# Patient Record
Sex: MALE | Race: WHITE | Employment: OTHER | URBAN - METROPOLITAN AREA
[De-identification: names, ages, dates, MRNs, and addresses within clinical notes are randomized per-mention and may not be internally consistent; named-entity substitution may affect disease eponyms.]

---

## 2017-10-05 ENCOUNTER — TELEPHONE (OUTPATIENT)
Dept: UROLOGY | Age: 67
End: 2017-10-05

## 2017-10-05 ENCOUNTER — OFFICE VISIT (OUTPATIENT)
Dept: UROLOGY | Age: 67
End: 2017-10-05
Payer: COMMERCIAL

## 2017-10-05 VITALS
SYSTOLIC BLOOD PRESSURE: 97 MMHG | HEART RATE: 93 BPM | TEMPERATURE: 97.9 F | HEIGHT: 71 IN | WEIGHT: 180 LBS | BODY MASS INDEX: 25.2 KG/M2 | DIASTOLIC BLOOD PRESSURE: 70 MMHG

## 2017-10-05 DIAGNOSIS — N40.1 BENIGN NON-NODULAR PROSTATIC HYPERPLASIA WITH LOWER URINARY TRACT SYMPTOMS: ICD-10-CM

## 2017-10-05 DIAGNOSIS — R33.9 URINARY RETENTION: Primary | ICD-10-CM

## 2017-10-05 PROCEDURE — 51798 US URINE CAPACITY MEASURE: CPT | Performed by: UROLOGY

## 2017-10-05 PROCEDURE — 99204 OFFICE O/P NEW MOD 45 MIN: CPT | Performed by: UROLOGY

## 2017-10-05 RX ORDER — OXYCODONE HYDROCHLORIDE 5 MG/1
5 TABLET ORAL 3 TIMES DAILY PRN
COMMUNITY
Start: 2017-10-04 | End: 2017-11-28

## 2017-10-05 RX ORDER — VERAPAMIL HYDROCHLORIDE 240 MG/1
240 TABLET, FILM COATED, EXTENDED RELEASE ORAL DAILY
COMMUNITY
Start: 2016-12-29 | End: 2017-11-28

## 2017-10-05 RX ORDER — TAMSULOSIN HYDROCHLORIDE 0.4 MG/1
CAPSULE ORAL
Qty: 60 CAPSULE | Refills: 1 | Status: SHIPPED | OUTPATIENT
Start: 2017-10-05 | End: 2017-11-30

## 2017-10-05 RX ORDER — LOSARTAN POTASSIUM 100 MG/1
100 TABLET ORAL DAILY
COMMUNITY
Start: 2016-12-07

## 2017-10-05 RX ORDER — ALBUTEROL SULFATE 90 UG/1
1 AEROSOL, METERED RESPIRATORY (INHALATION) PRN
COMMUNITY
Start: 2016-11-14

## 2017-10-05 RX ORDER — TAMSULOSIN HYDROCHLORIDE 0.4 MG/1
0.4 CAPSULE ORAL DAILY
COMMUNITY
Start: 2017-09-07 | End: 2017-10-05 | Stop reason: SDUPTHER

## 2017-10-05 RX ORDER — LORAZEPAM 0.5 MG/1
0.5 TABLET ORAL NIGHTLY
COMMUNITY
End: 2017-12-21 | Stop reason: ALTCHOICE

## 2017-10-05 RX ORDER — METOPROLOL TARTRATE 50 MG/1
25 TABLET, FILM COATED ORAL 2 TIMES DAILY
COMMUNITY

## 2017-10-05 RX ORDER — MULTIVITAMIN WITH IRON
1 TABLET ORAL DAILY
COMMUNITY
End: 2017-12-13

## 2017-10-05 RX ORDER — GABAPENTIN 300 MG/1
300 CAPSULE ORAL 3 TIMES DAILY
COMMUNITY
Start: 2017-10-04 | End: 2017-11-28

## 2017-10-05 ASSESSMENT — ENCOUNTER SYMPTOMS
BACK PAIN: 1
EYE REDNESS: 0
NAUSEA: 0
SHORTNESS OF BREATH: 0
VOMITING: 0
COUGH: 0
ABDOMINAL PAIN: 0
EYE PAIN: 0
WHEEZING: 0
COLOR CHANGE: 0

## 2017-10-05 NOTE — PROGRESS NOTES
Lab/Culture results: none    Imaging Reviewed during this Office Visit: none    (results were independently reviewed by physician and radiology report verified)    PAST MEDICAL, FAMILY AND SOCIAL HISTORY:  Past Medical History:   Diagnosis Date    Hypertension     Lumbar spinal stenosis      Past Surgical History:   Procedure Laterality Date    LUMBAR LAMINECTOMY  09/05/2017    L2-3, L3-4    ROTATOR CUFF REPAIR Left     x2      Family History   Problem Relation Age of Onset    High Blood Pressure Mother     High Blood Pressure Father     High Blood Pressure Brother      Outpatient Prescriptions Marked as Taking for the 10/5/17 encounter (Office Visit) with Tony Dial MD   Medication Sig Dispense Refill    losartan (COZAAR) 100 MG tablet Take 100 mg by mouth daily      metoprolol tartrate (LOPRESSOR) 50 MG tablet Take 50 mg by mouth daily      verapamil (CALAN SR) 240 MG extended release tablet Take 240 mg by mouth daily      mometasone-formoterol (DULERA) 200-5 MCG/ACT inhaler Inhale 2 puffs into the lungs as needed      albuterol sulfate HFA (PROAIR HFA) 108 (90 Base) MCG/ACT inhaler Inhale 1 puff into the lungs as needed      LORazepam (ATIVAN) 0.5 MG tablet Take 0.5 mg by mouth every 6 hours as needed      tamsulosin (FLOMAX) 0.4 MG capsule Take 0.4 mg by mouth daily      oxyCODONE (ROXICODONE) 5 MG immediate release tablet Take 5 mg by mouth 3 times daily as needed .  gabapentin (NEURONTIN) 300 MG capsule Take 300 mg by mouth 3 times daily      Multiple Vitamins TABS Take 1 tablet by mouth daily         Prednisone  History   Smoking Status    Never Smoker   Smokeless Tobacco    Not on file      (If patient a smoker, smoking cessation counseling offered)   History   Alcohol Use    Yes     Comment: socially       REVIEW OF SYSTEMS:  Review of Systems   Constitutional: Negative for activity change, chills and fever. Eyes: Negative for pain, redness and visual disturbance.    Respiratory: Negative for cough, shortness of breath and wheezing. Cardiovascular: Negative for chest pain and leg swelling. Gastrointestinal: Negative for abdominal pain, nausea and vomiting. Genitourinary: Positive for difficulty urinating (urinary hesitancy and weak stream) and frequency. Negative for discharge, dysuria, flank pain, hematuria, scrotal swelling and testicular pain. Musculoskeletal: Positive for back pain. Negative for joint swelling and myalgias. Skin: Negative for color change and rash. Neurological: Negative for dizziness, tremors and numbness. Hematological: Negative for adenopathy. Does not bruise/bleed easily. Physical Exam:    This a 78 y.o. male   Vitals:    10/05/17 0850   BP: 97/70   Pulse: 93   Temp:      Body mass index is 25.1 kg/(m^2). Physical Exam  Constitutional: Patient in no acute distress. Neuro: Alert and oriented to person, place and time. Psych: Mood normal, affect normal  Skin: No rash noted  HEENT: Head: Normocephalic and atraumatic  Conjunctivae and EOM are normal. Pupils are equal, round  Nose: Normal  Right External Ear: Normal; Left External Ear: Normal  Mouth: Mucosa Moist  Neck: Supple  Lungs: Respiratory effort is normal  Cardiovascular: Warm & Pink  Abdomen: Soft, non-tender, non-distended with no CVA,  No flank tenderness,  Or hepatosplenomegaly   Lymphatics: No palpable lymphadenopathy. Bladder non-tender and not distended. Musculoskeletal: Normal gait and station      Assessment and Plan      1. Urinary retention    2. Benign non-nodular prostatic hyperplasia with lower urinary tract symptoms           Plan:    cysto for greenlight    Prescriptions Ordered:  No orders of the defined types were placed in this encounter.      Orders Placed:  Orders Placed This Encounter   Procedures    PSA, Diagnostic     Standing Status:   Future     Standing Expiration Date:   9/30/2018    GA MEASUREMENT,POST-VOID RESIDUAL VOLUME BY Susannah Chung

## 2017-10-05 NOTE — TELEPHONE ENCOUNTER
pts wife called asking about the Flomax she believes he was told to  Increase the flomax to 2 pills per day  But no meds were ordered and the pt will be out tomorrow   Please advise

## 2017-10-05 NOTE — MR AVS SNAPSHOT
After Visit Summary             Marialuisa Valle   10/5/2017 8:20 AM   Office Visit    Description:  Male : 1950   Provider:  Karime Giron MD   Department:  [facility] - [address] [place] [address] 114 and Future Appointments         Below is a list of your follow-up and future appointments. This may not be a complete list as you may have made appointments directly with providers that we are not aware of or your providers may have made some for you. Please call your providers to confirm appointments. It is important to keep your appointments. Please bring your current insurance card, photo ID, co-pay, and all medication bottles to your appointment. If self-pay, payment is expected at the time of service. Your To-Do List     Future Appointments Provider Department Dept Phone    10/17/2017 4:10 PM Karime Giron MD [facility] [phone]    Future Orders Complete By Expires    PSA, Diagnostic [[id number] Custom]  10/5/2017 2018    Follow-Up    Return for Cystoscopy. Information from Your Visit        Department     Name Address Phone Fax    [facility] [address]  [address]  [address] [address] [phone]      You Were Seen for:         Comments    Urinary retention   [[id number]]         Vital Signs     Blood Pressure Pulse Temperature Height Weight Body Mass Index    97/70 93 97.9 °F (36.6 °C) (Oral) 5' 11\" (1.803 m) 180 lb (81.6 kg) 25.1 kg/m2    Smoking Status                   Never Smoker           Additional Information about your Body Mass Index (BMI)           Your BMI as listed above is considered overweight (25.0-29.9). BMI is an estimate of body fat, calculated from your height and weight.   The higher your BMI, the greater your risk of heart disease, high blood pressure, type 2 diabetes, stroke, gallstones, arthritis, sleep apnea, and certain Hepatitis C screening is recommended for all adults regardless of risk factors born between [facility] at least once (lifetime) who have never been tested. 1950    Tetanus Combination Vaccine (1 - Tdap) 1/31/1970    Cholesterol Screening 1/31/1991    Diabetes Screening 1/31/1991    Colonoscopy 1/31/2001    Zoster Vaccine 1/31/2011    Pneumococcal Vaccines (two) for all adults aged 72 and over (1 of 2 - PCV13) 1/31/2016    Yearly Flu Vaccine (1) 9/1/2017            MyChart Signup           [email or web address] allows you to send messages to your doctor, view your test results, renew your prescriptions, schedule appointments, view visit notes, and more. How Do I Sign Up? 1. In your Internet browser, go to [email or web address]  2. Click on the Sign Up Now link in the Sign In box. You will see the New Member Sign Up page. 3. Enter your [email or web address] Access Code exactly as it appears below. You will not need to use this code after youve completed the sign-up process. If you do not sign up before the expiration date, you must request a new code. [email or web address] Access Code: [email or web address]  Expires: 12/4/2017  9:21 AM    4. Enter your Social Security Number ([id number]) and Date of Birth (mm/dd/yyyy) as indicated and click Submit. You will be taken to the next sign-up page. 5. Create a [email or web address] ID. This will be your [email or web address] login ID and cannot be changed, so think of one that is secure and easy to remember. 6. Create a [email or web address] password. You can change your password at any time. 7. Enter your Password Reset Question and Answer. This can be used at a later time if you forget your password. 8. Enter your e-mail address. You will receive e-mail notification when new information is available in [address] 9. Click Sign Up. You can now view your medical record.      Additional Information  If you have questions, please contact the physician practice where you receive care. Remember, [employer] is NOT to be used for urgent needs. For medical emergencies, dial 911. For questions regarding your [employer] account call [phone]. If you have a clinical question, please call your doctor's office.

## 2017-10-06 ENCOUNTER — HOSPITAL ENCOUNTER (OUTPATIENT)
Age: 67
Discharge: HOME | End: 2017-10-06
Payer: MEDICARE

## 2017-10-06 DIAGNOSIS — R33.9 URINARY RETENTION: ICD-10-CM

## 2017-10-06 LAB — PROSTATE SPECIFIC ANTIGEN: 0.45 UG/L

## 2017-10-06 PROCEDURE — 84153 ASSAY OF PSA TOTAL: CPT

## 2017-10-06 PROCEDURE — 36415 COLL VENOUS BLD VENIPUNCTURE: CPT

## 2017-10-11 ENCOUNTER — TELEPHONE (OUTPATIENT)
Dept: UROLOGY | Age: 67
End: 2017-10-11

## 2017-10-11 NOTE — TELEPHONE ENCOUNTER
Pt's wife called yesterday wanting to know if he should be taking the flomax twice daily or two at a time once per day. Also, had concerns that his urine output has worsened lately. Per Tano Quiroga CNP, he may take the medication however he would like, that proves to be more effective for him and continue increased fluids for proper urinary output. Cysto is scheduled for 10/17/17, keep appt. Pt was informed and verbalized understanding.

## 2017-10-17 ENCOUNTER — PROCEDURE VISIT (OUTPATIENT)
Dept: UROLOGY | Age: 67
End: 2017-10-17
Payer: MEDICARE

## 2017-10-17 VITALS
BODY MASS INDEX: 25.19 KG/M2 | TEMPERATURE: 97.4 F | DIASTOLIC BLOOD PRESSURE: 68 MMHG | SYSTOLIC BLOOD PRESSURE: 130 MMHG | HEIGHT: 71 IN | RESPIRATION RATE: 16 BRPM | WEIGHT: 179.9 LBS | HEART RATE: 59 BPM

## 2017-10-17 DIAGNOSIS — R39.14 BENIGN PROSTATIC HYPERPLASIA WITH INCOMPLETE BLADDER EMPTYING: Primary | ICD-10-CM

## 2017-10-17 DIAGNOSIS — N40.1 BENIGN PROSTATIC HYPERPLASIA WITH INCOMPLETE BLADDER EMPTYING: Primary | ICD-10-CM

## 2017-10-17 PROCEDURE — 52000 CYSTOURETHROSCOPY: CPT | Performed by: UROLOGY

## 2017-10-17 PROCEDURE — 1036F TOBACCO NON-USER: CPT | Performed by: UROLOGY

## 2017-11-01 ENCOUNTER — TELEPHONE (OUTPATIENT)
Dept: UROLOGY | Age: 67
End: 2017-11-01

## 2017-11-01 DIAGNOSIS — R30.0 DYSURIA: Primary | ICD-10-CM

## 2017-11-02 ENCOUNTER — HOSPITAL ENCOUNTER (OUTPATIENT)
Age: 67
Setting detail: SPECIMEN
Discharge: HOME | End: 2017-11-02
Payer: MEDICARE

## 2017-11-02 LAB
BILIRUBIN URINE: NEGATIVE
COLOR: YELLOW
COMMENT UA: NORMAL
GLUCOSE URINE: NEGATIVE
KETONES, URINE: NEGATIVE
LEUKOCYTE ESTERASE, URINE: NEGATIVE
NITRITE, URINE: NEGATIVE
PH UA: 7.5 (ref 5–8)
PROTEIN UA: NEGATIVE
SPECIFIC GRAVITY UA: 1.01 (ref 1–1.03)
TURBIDITY: CLEAR
URINE HGB: NEGATIVE
UROBILINOGEN, URINE: NORMAL

## 2017-11-03 DIAGNOSIS — R30.0 BURNING WITH URINATION: Primary | ICD-10-CM

## 2017-11-03 RX ORDER — CIPROFLOXACIN 500 MG/1
500 TABLET, FILM COATED ORAL 2 TIMES DAILY
Qty: 20 TABLET | Refills: 0 | Status: SHIPPED | OUTPATIENT
Start: 2017-11-03 | End: 2017-11-06 | Stop reason: ALTCHOICE

## 2017-11-03 NOTE — TELEPHONE ENCOUNTER
Pt called with the following complaints He relayed that he is  Having  a burning sensation in his genitels  in the middle of the night and  is unable to void at that time, he i is using a heat pack on it to relieve the pain , He also relayed  having trouble getting urine to start when he can void he is having an out put of 200-300 ml. He stated that last night he was up 8 times to use the restroom.

## 2017-11-04 LAB
CULTURE: NO GROWTH
CULTURE: NORMAL
Lab: NORMAL
SPECIMEN DESCRIPTION: NORMAL
STATUS: NORMAL

## 2017-11-06 ENCOUNTER — TELEPHONE (OUTPATIENT)
Dept: UROLOGY | Age: 67
End: 2017-11-06

## 2017-11-06 NOTE — TELEPHONE ENCOUNTER
Will stop Cipro culture was negative. Continue Flomax. He is seeing his Ortho Dr in am for worsening back pain. Follow up as scheduled, call with questions sooner. Verbalized understanding.

## 2017-11-16 ENCOUNTER — TELEPHONE (OUTPATIENT)
Dept: UROLOGY | Age: 67
End: 2017-11-16

## 2017-11-20 ENCOUNTER — TELEPHONE (OUTPATIENT)
Dept: UROLOGY | Age: 67
End: 2017-11-20

## 2017-11-28 ENCOUNTER — HOSPITAL ENCOUNTER (OUTPATIENT)
Dept: PAIN MANAGEMENT | Age: 67
Discharge: HOME | End: 2017-11-28
Payer: MEDICARE

## 2017-11-28 VITALS
BODY MASS INDEX: 23.1 KG/M2 | SYSTOLIC BLOOD PRESSURE: 128 MMHG | HEIGHT: 71 IN | RESPIRATION RATE: 16 BRPM | TEMPERATURE: 98.1 F | DIASTOLIC BLOOD PRESSURE: 78 MMHG | WEIGHT: 165 LBS | HEART RATE: 74 BPM

## 2017-11-28 DIAGNOSIS — M46.1 BILATERAL SACROILIITIS (HCC): Chronic | ICD-10-CM

## 2017-11-28 DIAGNOSIS — M96.1 POSTLAMINECTOMY SYNDROME, LUMBAR REGION: Chronic | ICD-10-CM

## 2017-11-28 PROCEDURE — 99204 OFFICE O/P NEW MOD 45 MIN: CPT | Performed by: ANESTHESIOLOGY

## 2017-11-28 PROCEDURE — 99203 OFFICE O/P NEW LOW 30 MIN: CPT

## 2017-11-28 RX ORDER — TIZANIDINE 2 MG/1
2 TABLET ORAL NIGHTLY
COMMUNITY
End: 2017-11-28 | Stop reason: SDUPTHER

## 2017-11-28 RX ORDER — TIZANIDINE 2 MG/1
2 TABLET ORAL NIGHTLY
Qty: 30 TABLET | Refills: 0 | Status: SHIPPED | OUTPATIENT
Start: 2017-11-28 | End: 2017-12-13

## 2017-11-28 ASSESSMENT — PAIN DESCRIPTION - PROGRESSION: CLINICAL_PROGRESSION: GRADUALLY WORSENING

## 2017-11-28 ASSESSMENT — PAIN DESCRIPTION - PAIN TYPE: TYPE: CHRONIC PAIN

## 2017-11-28 ASSESSMENT — PAIN SCALES - GENERAL: PAINLEVEL_OUTOF10: 9

## 2017-11-28 ASSESSMENT — PAIN DESCRIPTION - ONSET: ONSET: PROGRESSIVE

## 2017-11-28 ASSESSMENT — PAIN DESCRIPTION - ORIENTATION: ORIENTATION: LOWER

## 2017-11-28 ASSESSMENT — PAIN DESCRIPTION - LOCATION: LOCATION: BACK;BUTTOCKS

## 2017-11-28 ASSESSMENT — PAIN DESCRIPTION - DESCRIPTORS: DESCRIPTORS: STABBING;BURNING;CONSTANT

## 2017-11-28 ASSESSMENT — PAIN DESCRIPTION - FREQUENCY: FREQUENCY: CONTINUOUS

## 2017-11-28 NOTE — PROGRESS NOTES
Patient is here today for a Consult appointment. The patient is a [facility] patient : No     The [facility] allowed levels are: na    Type of last Diagnostics: lumbar  MR    Date of last diagnostics: 10/2017    Pain Assessment  Pain Assessment: 0-10  Pain Level: 9  Pain Type: Chronic pain  Pain Location: Back, Buttocks  Pain Orientation: Lower  Pain Radiating Towards: low back occ in legs to knee  Pain Descriptors: Stabbing, Burning, Constant  Pain Frequency: Continuous  Pain Onset: Progressive  Clinical Progression: Gradually worsening  Effect of Pain on Daily Activities: 4/5   doing ADL only   leg pain better since surgery in sept but back pain cont    MOI  Today  No    OARRS today  Yes     Result of OARRS - reviewed    Morphine Equivalent Dose as reported on OARRS:  0    Sleep Pattern,  2 hours per night   nightime awakenings and difficulty falling back asleep if awakened    ER visits related to Pain?  No  Date of ER visit   N/A    Other Hospitalization  No    Reason for Hospitalization na    Date of last Pain Intervention  August / 2017    Type of Pain Intervention back injection with dr Ebony Puri    Was there pain relief from Pain Intervention: No      Working  retired    Home Exercises rarely no regular exercise    When was your last Physical Therapy: oct 2017 aquatic therapy  Made pain worse       Are you having any of these Emotional Issues? normal    Are you seeing a Psychiatrist or Psychologist  No     Have you ever had thoughts of hurting yourself no    Outpatient Prescriptions Marked as Taking for the 11/28/17 encounter [facility] Encounter) with [facility] FLUORO RM 1   Medication Sig Dispense Refill    losartan (COZAAR) 100 MG tablet Take 100 mg by mouth daily      metoprolol tartrate (LOPRESSOR) 50 MG tablet Take 50 mg by mouth daily      mometasone-formoterol (DULERA) 200-5 MCG/ACT inhaler Inhale 2 puffs into the lungs as needed      albuterol sulfate HFA (PROAIR HFA) 108 (90 Base) MCG/ACT inhaler Inhale 1 puff into the lungs as needed      LORazepam (ATIVAN) 0.5 MG tablet Take 0.5 mg by mouth every 6 hours as needed      Multiple Vitamins TABS Take 1 tablet by mouth daily      tamsulosin (FLOMAX) 0.4 MG capsule Take 2 tablets by mouth daily 60 capsule 1       Are your Current Pain medication (s) managing the pain  No    Have been on any anti-inflammatory medications  Yes ibuprofen (Motrin)    Other Issues na    Electronically signed by Nikki Mata RN on 11/28/2017 at 1:16 PM

## 2017-11-28 NOTE — CONSULTS
[facility]                                   CONSULTATION    PATIENT NAME: Lucinda Campbell                     :        1950  MED REC NO:   [id number]                             ROOM:  ACCOUNT NO:   [de-identified]                           ADMIT DATE: 2017  PROVIDER:     Tiara Garcia    CONSULT DATE:  2017    Dr. Devika Robbins, thank you for your kind referral of the patient to our pain  clinic. Following is the narrative summary of my examination and  recommendation. CHIEF COMPLAINT:  Low back pain. HISTORY OF PRESENT ILLNESS:  The patient has been having low back pain for  the last 7-plus years without any history of trauma or any other inciting  factors. After failing conservative therapy, including physical therapy,  he was seen by another pain doctor in [facility] where he had 2 lumbar epidural  steroid injections which did not seem to help. Since the failure of the  conservative therapy, he ended up in the lumbar laminectomy by Dr. Devika Robbins on  2017. According to the wife, the surgery did not seem to help him  much. He still has the same pain in the lower back. It is mainly in the  lower lumbar area. It is constant with variable intensity with a pain  scale of 8/10 at the present time. He describes the pain as kind of  burning in nature and \"somebody is sticking a hot poker in my back. \"  Very  rarely he has some sharp pain going down the leg where the pain is very  intensified or severe, mainly to the right leg up to the knees. He has  occasional numbness in the bilateral feet. The pain can be worse after  standing for long and bending. The pain is decreased by resting. He has tried NSAID, OxyContin, Norco, etc in the past without much help. He rather not take any pain medication at all. No history of any bowel or  bladder incontinence.     PAST MEDICAL AND SURGICAL HISTORY: Medical history positive for  hypertension, on medication; and also benign prostatic hypertrophy, on  medication and is awaiting surgery in a month. There is no recent or acute  cardiopulmonary problem. Negative for MI, negative for stroke, negative  for diabetes. Surgical history positive for recent left shoulder surgery. I believe he has some partial frozen shoulder, for which he has gone  through physical therapy, which made the pain in the lower back worse. PERSONAL HISTORY:  The patient is . He is retired. He does not  smoke. He consumes alcohol occasionally. No history of any illegal  substance abuse. No history of depression. His sleep is disturbed because  of the frequent urination and also the pain itself. Appetite is average. He thinks he had lost about 15 pounds because of pain. PHYSICAL EXAMINATION:  VITAL SIGNS:  His vital signs are documented in the EPIC system. Blood  pressure is 122/78, pulse is 74, respirations 20, he is 5 feet 11 inches  tall, weighs about 165 pounds. GENERAL:  On clinical examination, the patient is a very pleasant  61-year-old  male patient, who does not appear to be in any acute  distress. He is awake, alert, oriented, communicative, is cooperative. He  is well-built, well-nourished. He has got some cervical kyphosis and also  has thoracolumbar kyphosis while walking. HEENT AND NECK:  Head is normocephalic. Pupils are equal and reactive. No  nasal or ear discharge. Trachea in the midline. No carotid bruits. No  increase in JVP. No thyroid or lymph node enlargement. UPPER EXTREMITIES:  His left shoulder range of motion is decreased by 50%  after the second surgery and is very painful. He also has some arthritic  changes in the fingers of both hands. LUNGS:  Clear. HEART:  S1, S2 well-heard. ABDOMEN:  Soft, nontender.   NEUROLOGIC:  Gait appears to be normal, but there is some kyphosis at the  cervical spine and thoracolumbar spine. He is able to stand on the toes  and heel, but unable to walk. MUSCULOSKELETAL:  Lumbosacral range of motion, flexion and extension is  decreased by at least 80% in both directions. Lumbar side-to-side  movements are within normal limits. On examination of the back, he does  have a scar from the previous surgery in the midline in the lumbar area,  seemed to be healed well. There is some diffuse tenderness over this scar,  also moderate tenderness in the left and right SI joints. LOWER EXTREMITIES:  On examination of the lower extremities, SLR is _____  70 degrees. Hip range of motion, both hip extension, internal and external  rotation are very limited because of the severe pressure and pain in the SI  joint bilaterally. SLR is 70 degrees on both sides. Muscle strength 4/5  and equal on both sides. DTRs 2+ in the knees, equal; 1+ in the ankles,  equal; plantars are downwards, equal.  There is no sensory loss for light  touch, pin prick, or cold sensation. No ankle edema. No varicose veins. Both the PT and DP are well-felt. No skin changes. MEDICATION LIST:  Cozaar, Lopressor, ProAir, and lorazepam.    IMPRESSION:  1. Chronic low back pain, status post lumbar laminectomy, M96.1.  2.  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 PLAN:  At this point, as the patient is having severe pain in the lower  back, most probably from SI joint arthritis, we would like to do a  diagnostic SI joint injection bilaterally at the earliest.  We will do 2 of  them at 2 weeks apart. Meantime, I believe he is waiting for the TURP to  be done within a month. He is not interested in taking pain medication nor  the physical therapy which made it worse. The procedure of SI joint  injection was explained to the patient along with the risks, benefits, and  complications at length. The patient was agreeable and making the  appointment for the same within a week or so.     Dr. Jayne Obrien, thank you again for the kind referral.  If you have any  questions, please feel free to give us a call in the pain clinic.         Pat JAVIER    D: 11/28/2017 14:59:35       T: 11/28/2017 18:36:43     [email or web address]  Job#: [id number]     Doc#: [id number]    CC:  Larry Rodriguez       PRIMARY CARE PHYSICIAN

## 2017-11-29 ENCOUNTER — TELEPHONE (OUTPATIENT)
Dept: UROLOGY | Age: 67
End: 2017-11-29

## 2017-11-29 ENCOUNTER — OFFICE VISIT (OUTPATIENT)
Dept: UROLOGY | Age: 67
End: 2017-11-29
Payer: MEDICARE

## 2017-11-29 VITALS
DIASTOLIC BLOOD PRESSURE: 75 MMHG | WEIGHT: 165.34 LBS | TEMPERATURE: 98.2 F | HEART RATE: 62 BPM | BODY MASS INDEX: 23.15 KG/M2 | SYSTOLIC BLOOD PRESSURE: 104 MMHG | HEIGHT: 71 IN

## 2017-11-29 DIAGNOSIS — R33.9 INCOMPLETE EMPTYING OF BLADDER: Primary | ICD-10-CM

## 2017-11-29 DIAGNOSIS — R30.0 DYSURIA: ICD-10-CM

## 2017-11-29 DIAGNOSIS — M54.5 CHRONIC LOW BACK PAIN, UNSPECIFIED BACK PAIN LATERALITY, WITH SCIATICA PRESENCE UNSPECIFIED: ICD-10-CM

## 2017-11-29 DIAGNOSIS — G89.29 CHRONIC LOW BACK PAIN, UNSPECIFIED BACK PAIN LATERALITY, WITH SCIATICA PRESENCE UNSPECIFIED: ICD-10-CM

## 2017-11-29 LAB
BILIRUBIN, POC: ABNORMAL
BLOOD URINE, POC: ABNORMAL
CLARITY, POC: CLEAR
COLOR, POC: YELLOW
GLUCOSE URINE, POC: ABNORMAL
KETONES, POC: ABNORMAL
LEUKOCYTE EST, POC: ABNORMAL
NITRITE, POC: ABNORMAL
PH, POC: ABNORMAL
PROTEIN, POC: ABNORMAL
SPECIFIC GRAVITY, POC: ABNORMAL
UROBILINOGEN, POC: ABNORMAL

## 2017-11-29 PROCEDURE — 99214 OFFICE O/P EST MOD 30 MIN: CPT | Performed by: NURSE PRACTITIONER

## 2017-11-29 PROCEDURE — 4040F PNEUMOC VAC/ADMIN/RCVD: CPT | Performed by: NURSE PRACTITIONER

## 2017-11-29 PROCEDURE — G8420 CALC BMI NORM PARAMETERS: HCPCS | Performed by: NURSE PRACTITIONER

## 2017-11-29 PROCEDURE — 81003 URINALYSIS AUTO W/O SCOPE: CPT | Performed by: NURSE PRACTITIONER

## 2017-11-29 PROCEDURE — 51798 US URINE CAPACITY MEASURE: CPT | Performed by: NURSE PRACTITIONER

## 2017-11-29 PROCEDURE — 51702 INSERT TEMP BLADDER CATH: CPT | Performed by: NURSE PRACTITIONER

## 2017-11-29 PROCEDURE — G8427 DOCREV CUR MEDS BY ELIG CLIN: HCPCS | Performed by: NURSE PRACTITIONER

## 2017-11-29 PROCEDURE — 3017F COLORECTAL CA SCREEN DOC REV: CPT | Performed by: NURSE PRACTITIONER

## 2017-11-29 PROCEDURE — G8484 FLU IMMUNIZE NO ADMIN: HCPCS | Performed by: NURSE PRACTITIONER

## 2017-11-29 PROCEDURE — 1123F ACP DISCUSS/DSCN MKR DOCD: CPT | Performed by: NURSE PRACTITIONER

## 2017-11-29 PROCEDURE — 1036F TOBACCO NON-USER: CPT | Performed by: NURSE PRACTITIONER

## 2017-11-29 RX ORDER — PHENAZOPYRIDINE HYDROCHLORIDE 200 MG/1
200 TABLET, FILM COATED ORAL 3 TIMES DAILY PRN
Qty: 9 TABLET | Refills: 0 | Status: SHIPPED | OUTPATIENT
Start: 2017-11-29 | End: 2017-11-30

## 2017-11-29 ASSESSMENT — ENCOUNTER SYMPTOMS
BACK PAIN: 0
EYE PAIN: 0
WHEEZING: 0
SHORTNESS OF BREATH: 0
VOMITING: 0
COLOR CHANGE: 0
COUGH: 0
ABDOMINAL PAIN: 1
NAUSEA: 0
EYE REDNESS: 0

## 2017-11-29 NOTE — PATIENT INSTRUCTIONS
Continue Flomax 2x per day      Pyridium for dysuria      Call if unable to urinate later, call with an update regardless.     Follow up as scheduled tomorrow.      discuss anxiety with PCP

## 2017-11-29 NOTE — TELEPHONE ENCOUNTER
He has not voided yet since straight cath, burning is better, no hematuria. He typically voids once during the day and does most of his urination at night. He is not uncomfortable and we be here for sched appt tomorrow with Dr Jose Soto.

## 2017-11-29 NOTE — PROGRESS NOTES
[facility]  [facility] - [place]  [address]  [address]  [address] [facility] [address]  Dept: [address] [facility] Office Note - Established    Patient:  Alba Scott  YOB: 1950  Date: 11/29/2017    The patient is a 78 y.o. male who presents today for evaluation of the following problems:   Chief Complaint   Patient presents with    Urinary Retention     pt states since last night he has had decreased urinary output and incomplete emptying, pt states he \"feels full\"       HPI  Here for decreased urinary stream, and feeling like he is not emptying well. He has noticed less output over the last few days. He is unsure today if he can void today feels pressure. Wants GL surgery today. Wife says his concerns over urinary issues is making him more anxious. He is having no constipation. He is coming in tomorrow to discuss GL with Dr Colonel Gallegos. He has chronic back issues, and is having more back pain, is currently in pain management. Was started last night on Tizanidine by pain management people. His anxiety is so much worse with the pain issues, it is affecting family relationships and ADL's. Weaning off Lorazepam- has not recently discussed anxiety with PCP. He is uncomfortable doing anything but laying. Summary of old records: N/A    Additional History: Straight cath for 280 ml aldo colored urine: skin prep per policy,  Lidocaine 2% 102 mg Jelly inserted into urethra. #14 inserted, meeting resistance at the prostate, return clear aldo urine 280 ml. Cath removed. C/o residual burning, anxiuos, relaxation breathing instructins given, Lidocaine 2% jelly 10 ml inserted in urethra with some relief.          Procedures Today: N/A    Urinalysis today:  Results for POC orders placed in visit on 11/29/17   POCT Urinalysis No Micro (Auto)   Result Value Ref Range    Color, UA yellow     Clarity, UA clear     Glucose, UA POC neg     Bilirubin, UA -     Ketones, UA - daily      tamsulosin (FLOMAX) 0.4 MG capsule Take 2 tablets by mouth daily 60 capsule 1       Prednisone  History   Smoking Status    Never Smoker   Smokeless Tobacco    Never Used     (If patient a smoker, smoking cessation counseling offered)    History   Alcohol Use    Yes     Comment: socially       REVIEW OF SYSTEMS:  Review of Systems   Constitutional: Negative for activity change, chills and fever. Eyes: Negative for pain, redness and visual disturbance. Respiratory: Negative for cough, shortness of breath and wheezing. Cardiovascular: Negative for chest pain and leg swelling. Gastrointestinal: Positive for abdominal pain. Negative for nausea and vomiting. Genitourinary: Positive for difficulty urinating and dysuria (intermittent). Negative for discharge, flank pain, frequency, hematuria, scrotal swelling and testicular pain. Musculoskeletal: Negative for back pain, joint swelling and myalgias. Skin: Negative for color change and rash. Neurological: Negative for dizziness, tremors and numbness. Hematological: Negative for adenopathy. Does not bruise/bleed easily. Physical Exam:      Vitals:    11/29/17 0901   BP: 104/75   Pulse: 62   Temp: 98.2 °F (36.8 °C)     Body mass index is 23.07 kg/m². Patient is a 78 y.o. male in no acute distress and alert and oriented to person, place and time. Physical Exam  Constitutional: Patient in no acute distress. Neuro: Alert and oriented to person, place and time. Psych: Mood normal, affect normal  Skin: warm, pink, No rash noted  HEENT: Head: Normocephalic and atraumatic  Conjunctivae and EOM are normal. Pupils are equal, round  Nose: Normal  Right External Ear: Normal; Left External Ear: Normal  Mouth: Mucosa Moist  Neck: Supple  Lungs: Respiratory effort is normal  Cardiovascular: strong and regular  Abdomen: Soft, non-tender, non-distended  Bladder non-tender and not distended. PVR prr US- 320 ml.    Musculoskeletal: Normal gait and station      Assessment and Plan      1. Incomplete emptying of bladder    2. Dysuria    3. Chronic low back pain, unspecified back pain laterality, with sciatica presence unspecified           Plan:    Continue Flomax 2x per day     Straight cathed for 280 ml at visit. Urine neg LE, neg nitrites    Pyridium for dysuria    Call if unable to urinate later, call with an update regardless. Discuss anxiety with PCP. Follow up as scheduled tomorrow. No Follow-up on file. Prescriptions Ordered:  Orders Placed This Encounter   Medications    phenazopyridine (PYRIDIUM) 200 MG tablet     Sig: Take 1 tablet by mouth 3 times daily as needed for Pain If not cost effective direct pt to OTC version and instruct on dosing.      Dispense:  9 tablet     Refill:  0      Orders Placed:  Orders Placed This Encounter   Procedures    POCT Urinalysis No Micro (Auto)    [place] MEASUREMENT,POST-VOID RESIDUAL VOLUME BY US,NON-IMAGING          Con Marion, CNP

## 2017-11-30 ENCOUNTER — OFFICE VISIT (OUTPATIENT)
Dept: UROLOGY | Age: 67
End: 2017-11-30
Payer: MEDICARE

## 2017-11-30 ENCOUNTER — TELEPHONE (OUTPATIENT)
Dept: UROLOGY | Age: 67
End: 2017-11-30

## 2017-11-30 VITALS
WEIGHT: 163.4 LBS | SYSTOLIC BLOOD PRESSURE: 131 MMHG | DIASTOLIC BLOOD PRESSURE: 82 MMHG | HEIGHT: 71 IN | HEART RATE: 77 BPM | BODY MASS INDEX: 22.88 KG/M2 | TEMPERATURE: 97.9 F

## 2017-11-30 DIAGNOSIS — R33.8 BENIGN PROSTATIC HYPERPLASIA WITH URINARY RETENTION: Primary | ICD-10-CM

## 2017-11-30 DIAGNOSIS — R35.0 FREQUENCY OF MICTURITION: ICD-10-CM

## 2017-11-30 DIAGNOSIS — N40.1 BENIGN PROSTATIC HYPERPLASIA WITH URINARY RETENTION: Primary | ICD-10-CM

## 2017-11-30 DIAGNOSIS — R35.1 NOCTURIA: ICD-10-CM

## 2017-11-30 PROCEDURE — G8420 CALC BMI NORM PARAMETERS: HCPCS | Performed by: UROLOGY

## 2017-11-30 PROCEDURE — 1036F TOBACCO NON-USER: CPT | Performed by: UROLOGY

## 2017-11-30 PROCEDURE — G8484 FLU IMMUNIZE NO ADMIN: HCPCS | Performed by: UROLOGY

## 2017-11-30 PROCEDURE — 1123F ACP DISCUSS/DSCN MKR DOCD: CPT | Performed by: UROLOGY

## 2017-11-30 PROCEDURE — 99214 OFFICE O/P EST MOD 30 MIN: CPT | Performed by: UROLOGY

## 2017-11-30 PROCEDURE — G8427 DOCREV CUR MEDS BY ELIG CLIN: HCPCS | Performed by: UROLOGY

## 2017-11-30 PROCEDURE — 3017F COLORECTAL CA SCREEN DOC REV: CPT | Performed by: UROLOGY

## 2017-11-30 PROCEDURE — 4040F PNEUMOC VAC/ADMIN/RCVD: CPT | Performed by: UROLOGY

## 2017-11-30 RX ORDER — TAMSULOSIN HYDROCHLORIDE 0.4 MG/1
0.4 CAPSULE ORAL DAILY
Qty: 90 CAPSULE | Refills: 3 | Status: SHIPPED | OUTPATIENT
Start: 2017-11-30 | End: 2018-01-02

## 2017-11-30 RX ORDER — DOCUSATE SODIUM 100 MG/1
300 CAPSULE, LIQUID FILLED ORAL DAILY
COMMUNITY
End: 2017-12-04 | Stop reason: SDUPTHER

## 2017-11-30 ASSESSMENT — ENCOUNTER SYMPTOMS
COUGH: 0
WHEEZING: 0
EYE REDNESS: 0
NAUSEA: 0
ABDOMINAL PAIN: 1
VOMITING: 0
EYE PAIN: 0
COLOR CHANGE: 0
SHORTNESS OF BREATH: 0
BACK PAIN: 0

## 2017-11-30 NOTE — PROGRESS NOTES
Gastrointestinal: Positive for abdominal pain. Negative for nausea and vomiting. Genitourinary: Positive for difficulty urinating and dysuria (burns ). Negative for discharge, flank pain, frequency, hematuria, scrotal swelling, testicular pain and urgency. Musculoskeletal: Negative for back pain, joint swelling and myalgias. Skin: Negative for color change, rash and wound. Neurological: Negative for dizziness, tremors and numbness. Hematological: Negative for adenopathy. Does not bruise/bleed easily. Physical Exam:      Vitals:    11/30/17 1117   BP: 131/82   Pulse: 77   Temp: 97.9 °F (36.6 °C)     Body mass index is 22.79 kg/m². Patient is a 78 y.o. male in no acute distress and alert and oriented to person, place and time. Physical Exam  Constitutional: Patient in no acute distress. Neuro: Alert and oriented to person, place and time. Psych: Mood normal, affect normal  Skin: No rash noted  HEENT: Head: Normocephalic and atraumatic  Conjunctivae and EOM are normal. Pupils are equal, round  Nose: Normal  Right External Ear: Normal; Left External Ear: Normal  Mouth: Mucosa Moist  Neck: Supple  Lungs: Respiratory effort is normal  Cardiovascular: Warm & Pink  Abdomen: Soft, non-tender, non-distended with no CVA,  No flank tenderness,  Or hepatosplenomegaly   Lymphatics: No palpable lymphadenopathy. Assessment and Plan      1. Benign prostatic hyperplasia with urinary retention    2. Nocturia    3. Frequency of micturition           Plan:     cysto greenlight laser  Return for Surgery. Prescriptions Ordered:  No orders of the defined types were placed in this encounter. Orders Placed:  No orders of the defined types were placed in this encounter.          Rafael Ferrera MD

## 2017-12-01 ENCOUNTER — ANESTHESIA (OUTPATIENT)
Dept: OPERATING ROOM | Age: 67
End: 2017-12-01
Payer: MEDICARE

## 2017-12-01 ENCOUNTER — ANESTHESIA EVENT (OUTPATIENT)
Dept: OPERATING ROOM | Age: 67
End: 2017-12-01
Payer: MEDICARE

## 2017-12-01 ENCOUNTER — HOSPITAL ENCOUNTER (OUTPATIENT)
Age: 67
Setting detail: OUTPATIENT SURGERY
Discharge: HOME | End: 2017-12-01
Attending: UROLOGY | Admitting: UROLOGY
Payer: MEDICARE

## 2017-12-01 VITALS
HEIGHT: 71 IN | DIASTOLIC BLOOD PRESSURE: 66 MMHG | HEART RATE: 97 BPM | TEMPERATURE: 98.1 F | SYSTOLIC BLOOD PRESSURE: 120 MMHG | RESPIRATION RATE: 18 BRPM | WEIGHT: 163 LBS | OXYGEN SATURATION: 98 % | BODY MASS INDEX: 22.82 KG/M2

## 2017-12-01 VITALS — DIASTOLIC BLOOD PRESSURE: 54 MMHG | TEMPERATURE: 95.6 F | SYSTOLIC BLOOD PRESSURE: 106 MMHG | OXYGEN SATURATION: 99 %

## 2017-12-01 PROCEDURE — 2580000003 HC RX 258: Performed by: ANESTHESIOLOGY

## 2017-12-01 PROCEDURE — 2720000010 HC SURG SUPPLY STERILE: Performed by: UROLOGY

## 2017-12-01 PROCEDURE — 3600000004 HC SURGERY LEVEL 4 BASE: Performed by: UROLOGY

## 2017-12-01 PROCEDURE — 6370000000 HC RX 637 (ALT 250 FOR IP): Performed by: STUDENT IN AN ORGANIZED HEALTH CARE EDUCATION/TRAINING PROGRAM

## 2017-12-01 PROCEDURE — 6360000002 HC RX W HCPCS: Performed by: ANESTHESIOLOGY

## 2017-12-01 PROCEDURE — 3700000001 HC ADD 15 MINUTES (ANESTHESIA): Performed by: UROLOGY

## 2017-12-01 PROCEDURE — 7100000001 HC PACU RECOVERY - ADDTL 15 MIN: Performed by: UROLOGY

## 2017-12-01 PROCEDURE — 2500000003 HC RX 250 WO HCPCS: Performed by: SPECIALIST

## 2017-12-01 PROCEDURE — 7100000010 HC PHASE II RECOVERY - FIRST 15 MIN: Performed by: UROLOGY

## 2017-12-01 PROCEDURE — 3600000014 HC SURGERY LEVEL 4 ADDTL 15MIN: Performed by: UROLOGY

## 2017-12-01 PROCEDURE — 6360000002 HC RX W HCPCS: Performed by: SPECIALIST

## 2017-12-01 PROCEDURE — 7100000000 HC PACU RECOVERY - FIRST 15 MIN: Performed by: UROLOGY

## 2017-12-01 PROCEDURE — 6360000002 HC RX W HCPCS: Performed by: UROLOGY

## 2017-12-01 PROCEDURE — C1769 GUIDE WIRE: HCPCS | Performed by: UROLOGY

## 2017-12-01 PROCEDURE — 2500000003 HC RX 250 WO HCPCS: Performed by: UROLOGY

## 2017-12-01 PROCEDURE — 2580000003 HC RX 258: Performed by: UROLOGY

## 2017-12-01 PROCEDURE — 3700000000 HC ANESTHESIA ATTENDED CARE: Performed by: UROLOGY

## 2017-12-01 RX ORDER — MAGNESIUM HYDROXIDE 1200 MG/15ML
LIQUID ORAL CONTINUOUS PRN
Status: DISCONTINUED | OUTPATIENT
Start: 2017-12-01 | End: 2017-12-01 | Stop reason: HOSPADM

## 2017-12-01 RX ORDER — CIPROFLOXACIN 2 MG/ML
400 INJECTION, SOLUTION INTRAVENOUS
Status: COMPLETED | OUTPATIENT
Start: 2017-12-01 | End: 2017-12-01

## 2017-12-01 RX ORDER — PROPOFOL 10 MG/ML
INJECTION, EMULSION INTRAVENOUS PRN
Status: DISCONTINUED | OUTPATIENT
Start: 2017-12-01 | End: 2017-12-01 | Stop reason: SDUPTHER

## 2017-12-01 RX ORDER — EPHEDRINE SULFATE 50 MG/ML
INJECTION, SOLUTION INTRAVENOUS PRN
Status: DISCONTINUED | OUTPATIENT
Start: 2017-12-01 | End: 2017-12-01 | Stop reason: SDUPTHER

## 2017-12-01 RX ORDER — OXYBUTYNIN CHLORIDE 5 MG/1
5 TABLET, EXTENDED RELEASE ORAL DAILY
Qty: 3 TABLET | Refills: 0 | Status: SHIPPED | OUTPATIENT
Start: 2017-12-01 | End: 2017-12-21 | Stop reason: ALTCHOICE

## 2017-12-01 RX ORDER — SODIUM CHLORIDE, SODIUM LACTATE, POTASSIUM CHLORIDE, CALCIUM CHLORIDE 600; 310; 30; 20 MG/100ML; MG/100ML; MG/100ML; MG/100ML
INJECTION, SOLUTION INTRAVENOUS CONTINUOUS
Status: DISCONTINUED | OUTPATIENT
Start: 2017-12-01 | End: 2017-12-01 | Stop reason: HOSPADM

## 2017-12-01 RX ORDER — OXYBUTYNIN CHLORIDE 5 MG/1
10 TABLET ORAL ONCE
Status: COMPLETED | OUTPATIENT
Start: 2017-12-01 | End: 2017-12-01

## 2017-12-01 RX ORDER — ATROPA BELLADONNA AND OPIUM 16.2; 6 MG/1; MG/1
60 SUPPOSITORY RECTAL EVERY 8 HOURS PRN
Status: DISCONTINUED | OUTPATIENT
Start: 2017-12-01 | End: 2017-12-01 | Stop reason: HOSPADM

## 2017-12-01 RX ORDER — MORPHINE SULFATE 2 MG/ML
2 INJECTION, SOLUTION INTRAMUSCULAR; INTRAVENOUS EVERY 5 MIN PRN
Status: DISCONTINUED | OUTPATIENT
Start: 2017-12-01 | End: 2017-12-01 | Stop reason: HOSPADM

## 2017-12-01 RX ORDER — WATER 1000 ML/1000ML
INJECTION, SOLUTION INTRAVENOUS PRN
Status: DISCONTINUED | OUTPATIENT
Start: 2017-12-01 | End: 2017-12-01 | Stop reason: HOSPADM

## 2017-12-01 RX ORDER — MORPHINE SULFATE 2 MG/ML
1 INJECTION, SOLUTION INTRAMUSCULAR; INTRAVENOUS EVERY 5 MIN PRN
Status: DISCONTINUED | OUTPATIENT
Start: 2017-12-01 | End: 2017-12-01 | Stop reason: HOSPADM

## 2017-12-01 RX ORDER — OXYCODONE HYDROCHLORIDE AND ACETAMINOPHEN 5; 325 MG/1; MG/1
1 TABLET ORAL EVERY 6 HOURS PRN
Qty: 10 TABLET | Refills: 0 | Status: SHIPPED | OUTPATIENT
Start: 2017-12-01 | End: 2017-12-13

## 2017-12-01 RX ORDER — MIDAZOLAM HYDROCHLORIDE 1 MG/ML
2 INJECTION INTRAMUSCULAR; INTRAVENOUS ONCE
Status: COMPLETED | OUTPATIENT
Start: 2017-12-01 | End: 2017-12-01

## 2017-12-01 RX ORDER — DOCUSATE SODIUM 100 MG/1
100 CAPSULE, LIQUID FILLED ORAL 2 TIMES DAILY
Qty: 14 CAPSULE | Refills: 0 | Status: SHIPPED | OUTPATIENT
Start: 2017-12-01

## 2017-12-01 RX ORDER — LIDOCAINE HYDROCHLORIDE 10 MG/ML
INJECTION, SOLUTION EPIDURAL; INFILTRATION; INTRACAUDAL; PERINEURAL PRN
Status: DISCONTINUED | OUTPATIENT
Start: 2017-12-01 | End: 2017-12-01 | Stop reason: SDUPTHER

## 2017-12-01 RX ORDER — ONDANSETRON 2 MG/ML
INJECTION INTRAMUSCULAR; INTRAVENOUS PRN
Status: DISCONTINUED | OUTPATIENT
Start: 2017-12-01 | End: 2017-12-01 | Stop reason: SDUPTHER

## 2017-12-01 RX ORDER — CIPROFLOXACIN 500 MG/1
500 TABLET, FILM COATED ORAL 2 TIMES DAILY
Qty: 6 TABLET | Refills: 0 | Status: SHIPPED | OUTPATIENT
Start: 2017-12-01 | End: 2017-12-04

## 2017-12-01 RX ORDER — FENTANYL CITRATE 50 UG/ML
INJECTION, SOLUTION INTRAMUSCULAR; INTRAVENOUS PRN
Status: DISCONTINUED | OUTPATIENT
Start: 2017-12-01 | End: 2017-12-01 | Stop reason: SDUPTHER

## 2017-12-01 RX ADMIN — OXYBUTYNIN CHLORIDE 10 MG: 5 TABLET ORAL at 14:33

## 2017-12-01 RX ADMIN — FENTANYL CITRATE 25 MCG: 50 INJECTION INTRAMUSCULAR; INTRAVENOUS at 11:25

## 2017-12-01 RX ADMIN — FENTANYL CITRATE 25 MCG: 50 INJECTION INTRAMUSCULAR; INTRAVENOUS at 10:47

## 2017-12-01 RX ADMIN — PHENYLEPHRINE HYDROCHLORIDE 200 MCG: 10 INJECTION INTRAVENOUS at 10:50

## 2017-12-01 RX ADMIN — PROPOFOL 50 MG: 10 INJECTION, EMULSION INTRAVENOUS at 11:40

## 2017-12-01 RX ADMIN — EPHEDRINE SULFATE 5 MG: 50 INJECTION INTRAMUSCULAR; INTRAVENOUS; SUBCUTANEOUS at 11:24

## 2017-12-01 RX ADMIN — EPHEDRINE SULFATE 5 MG: 50 INJECTION INTRAMUSCULAR; INTRAVENOUS; SUBCUTANEOUS at 10:55

## 2017-12-01 RX ADMIN — EPHEDRINE SULFATE 5 MG: 50 INJECTION INTRAMUSCULAR; INTRAVENOUS; SUBCUTANEOUS at 11:51

## 2017-12-01 RX ADMIN — EPHEDRINE SULFATE 10 MG: 50 INJECTION INTRAMUSCULAR; INTRAVENOUS; SUBCUTANEOUS at 10:58

## 2017-12-01 RX ADMIN — MIDAZOLAM HYDROCHLORIDE 2 MG: 1 INJECTION, SOLUTION INTRAMUSCULAR; INTRAVENOUS at 09:17

## 2017-12-01 RX ADMIN — PROPOFOL 200 MG: 10 INJECTION, EMULSION INTRAVENOUS at 10:47

## 2017-12-01 RX ADMIN — FENTANYL CITRATE 25 MCG: 50 INJECTION INTRAMUSCULAR; INTRAVENOUS at 11:40

## 2017-12-01 RX ADMIN — EPHEDRINE SULFATE 5 MG: 50 INJECTION INTRAMUSCULAR; INTRAVENOUS; SUBCUTANEOUS at 11:27

## 2017-12-01 RX ADMIN — ATROPA BELLADONNA AND OPIUM 60 MG: 16.2; 6 SUPPOSITORY RECTAL at 14:03

## 2017-12-01 RX ADMIN — EPHEDRINE SULFATE 10 MG: 50 INJECTION INTRAMUSCULAR; INTRAVENOUS; SUBCUTANEOUS at 10:54

## 2017-12-01 RX ADMIN — LIDOCAINE HYDROCHLORIDE 50 MG: 10 INJECTION, SOLUTION EPIDURAL; INFILTRATION; INTRACAUDAL; PERINEURAL at 10:47

## 2017-12-01 RX ADMIN — PHENYLEPHRINE HYDROCHLORIDE 200 MCG: 10 INJECTION INTRAVENOUS at 10:51

## 2017-12-01 RX ADMIN — ONDANSETRON 4 MG: 2 INJECTION INTRAMUSCULAR; INTRAVENOUS at 11:43

## 2017-12-01 RX ADMIN — CIPROFLOXACIN 400 MG: 2 INJECTION, SOLUTION INTRAVENOUS at 10:47

## 2017-12-01 RX ADMIN — SODIUM CHLORIDE, POTASSIUM CHLORIDE, SODIUM LACTATE AND CALCIUM CHLORIDE: 600; 310; 30; 20 INJECTION, SOLUTION INTRAVENOUS at 09:12

## 2017-12-01 RX ADMIN — MORPHINE SULFATE 2 MG: 2 INJECTION, SOLUTION INTRAMUSCULAR; INTRAVENOUS at 12:44

## 2017-12-01 RX ADMIN — FENTANYL CITRATE 25 MCG: 50 INJECTION INTRAMUSCULAR; INTRAVENOUS at 11:50

## 2017-12-01 RX ADMIN — EPHEDRINE SULFATE 10 MG: 50 INJECTION INTRAMUSCULAR; INTRAVENOUS; SUBCUTANEOUS at 11:40

## 2017-12-01 RX ADMIN — MORPHINE SULFATE 2 MG: 2 INJECTION, SOLUTION INTRAMUSCULAR; INTRAVENOUS at 13:09

## 2017-12-01 ASSESSMENT — PAIN SCALES - GENERAL
PAINLEVEL_OUTOF10: 10
PAINLEVEL_OUTOF10: 0
PAINLEVEL_OUTOF10: 10
PAINLEVEL_OUTOF10: 9
PAINLEVEL_OUTOF10: 8
PAINLEVEL_OUTOF10: 10
PAINLEVEL_OUTOF10: 4
PAINLEVEL_OUTOF10: 5
PAINLEVEL_OUTOF10: 8
PAINLEVEL_OUTOF10: 10
PAINLEVEL_OUTOF10: 10
PAINLEVEL_OUTOF10: 8
PAINLEVEL_OUTOF10: 10
PAINLEVEL_OUTOF10: 10
PAINLEVEL_OUTOF10: 9

## 2017-12-01 ASSESSMENT — PULMONARY FUNCTION TESTS
PIF_VALUE: 8
PIF_VALUE: 6
PIF_VALUE: 4
PIF_VALUE: 4
PIF_VALUE: 3
PIF_VALUE: 4
PIF_VALUE: 4
PIF_VALUE: 11
PIF_VALUE: 17
PIF_VALUE: 13
PIF_VALUE: 4
PIF_VALUE: 1
PIF_VALUE: 5
PIF_VALUE: 4
PIF_VALUE: 2
PIF_VALUE: 1
PIF_VALUE: 13
PIF_VALUE: 4
PIF_VALUE: 5
PIF_VALUE: 13
PIF_VALUE: 4
PIF_VALUE: 5
PIF_VALUE: 4
PIF_VALUE: 8
PIF_VALUE: 13
PIF_VALUE: 0
PIF_VALUE: 4
PIF_VALUE: 13
PIF_VALUE: 1
PIF_VALUE: 4
PIF_VALUE: 8
PIF_VALUE: 13
PIF_VALUE: 14
PIF_VALUE: 4
PIF_VALUE: 4
PIF_VALUE: 1
PIF_VALUE: 2
PIF_VALUE: 7
PIF_VALUE: 11
PIF_VALUE: 12
PIF_VALUE: 6
PIF_VALUE: 13
PIF_VALUE: 13
PIF_VALUE: 5
PIF_VALUE: 8
PIF_VALUE: 5
PIF_VALUE: 0
PIF_VALUE: 18
PIF_VALUE: 12
PIF_VALUE: 13
PIF_VALUE: 4
PIF_VALUE: 7
PIF_VALUE: 12
PIF_VALUE: 13
PIF_VALUE: 3
PIF_VALUE: 4
PIF_VALUE: 5
PIF_VALUE: 7
PIF_VALUE: 5
PIF_VALUE: 5
PIF_VALUE: 12
PIF_VALUE: 5
PIF_VALUE: 12
PIF_VALUE: 12
PIF_VALUE: 5
PIF_VALUE: 5
PIF_VALUE: 6
PIF_VALUE: 4
PIF_VALUE: 8
PIF_VALUE: 7
PIF_VALUE: 13
PIF_VALUE: 5
PIF_VALUE: 13
PIF_VALUE: 5
PIF_VALUE: 5
PIF_VALUE: 1
PIF_VALUE: 4
PIF_VALUE: 1
PIF_VALUE: 5
PIF_VALUE: 5
PIF_VALUE: 9
PIF_VALUE: 13

## 2017-12-01 NOTE — OP NOTE
FACILITY:  [address] [place] [address]  DATE:  12/01/17  Rom Eastman  1950  [id number]     Surgeon: Dr. Soy Cat MD  Asst.: Dr. Vivian Martin MD  PREOPERATIVE DIAGNOSES:  1. Urinary retention. 2. Benign prostatic hyperplasia with obstruction. POSTOPERATIVE DIAGNOSIS:  1. Urinary retention. 2. Benign prostatic hyperplasia with obstruction. PROCEDURES PERFORMED:  1. Cystoscopy. 2. GreenLight photovaporization of the prostate. ANESTHESIA:  General.  COMPLICATIONS:  None. SPECIMENS:  Urine for culture. ESTIMATED BLOOD LOSS:  Minimal.  DRAINS:  A 22 Malay 3-way Nelson catheter.     INDICATIONS: Rom Eastman is a 78 y.o. male presents today for GreenLight photovaporization of the prostate. After risks, benefits and alternatives of the procedure were discussed with the patient, informed consent was obtained and the patient elected to proceed.     OPERATIVE SUMMARY:  The risks and benefits of the procedure were explained to the patient in the preoperative area. After informed consent was obtained, the patient was taken back to the operating room. The patient was transferred to the operating table and placed in a supine position. General anesthesia was induced and the patient was placed in the dorsal lithotomy position. He was prepped and draped in a sterile fashion and a time-out was performed to confirm patient identity and procedure. Prior to induction of anesthesia the patient was administered preoperative antibiotics and EPC cuffs were on and functioning. Our continuous flow sheath with obturator and lens was inserted through the patient's urethra and into the bladder. Upon entering the bladder we located both ureteral orifices, they were at a safe distance from the vesical neck. On evaluation of the prostate the patient was noted to have obstruction from large lateral lobes. No median lobe was identified.  The GreenLight fiber was then inserted after we removed our obturator and placed our working bridge through out continous flow sheath. GreenLight photovaporization was initiated beginning with bladder neck. We then turned our attention to the patient's left lateral lobe. At the 5 O'clock position we made a groove from the vesical neck down to the level of the verumontanum, which was used as our distal landmark to protect the external sphincter. We vaporized adenomatous tissue down to the level of the capsule. During this part of the procedure the power level was increased to 140 franco. This was used as a guide of depth, and carried around in a counter-clockwise fashion to the 12 O'clock position. The process was then repeated on the contralateral side starting at the 7 O'clock position. We vaporized adenomatous tissue down to the level of the capsule, which again was used as a guide of depth, and carried around in a clockwise fashion to the 12 O'clock position. Finally we turned our attention to the apical tissue. Extensive photovaporization of the prostate was performed. We then incised the bladder neck with our laser. At this time the irrigation was turned off and the prostatic urethra appeared to be wide open and no longer obstructed. Hemostasis was achieved and persistent. Both ureteral orifices were noted to be uninvolved in the resection. There was no scatter of laser fiber into the bladder. We did not go distal to the verumontanum. We worked at 80 franco of power at the bladder neck and near the apex of the prostate. The scope was removed and a 22-[place] 3-way Nelson catheter was inserted and irrigated to confirm position. Continuous bladder irrigation with normal saline was then initiated and 3 L of normal saline were allowed to infuse before the catheter was clamped. The patient was awoken and transferred to PACU. All instruments and equipment were accounted for at the end of the procedure.  Dr. Madelin Alcala was present and scrubbed for all critical portions procedure.     DISPOSITION:  The patient was transferred to PACU in good condition.  He will be discharged home from the hospital per the PACU team. Appropriate follow up will be arranged for catheter removal

## 2017-12-01 NOTE — H&P
Mei Clark           H&P     Patient:  Josemanuel Kendall  YOB: 1950  Date: 11/30/2017     The patient is a 78 y.o. male who presents today for evaluation of the following problems:        Chief Complaint   Patient presents with    Urinary Retention       discuss surgery . no longer having problems with retention          HPI  Wants greenligth, nocturia is troubling, weaker stream, no dysuria, no fevers, no blood thinners, no cardiac hx, cysto showed obstruction     Summary of old records: N/A     Additional History: N/A     Procedures Today: N/A     Urinalysis today:  No results found for this visit on 11/30/17. Last several PSA's:        Lab Results   Component Value Date     PSA 0.45 10/06/2017      Last total testosterone:  No results found for: TESTOSTERONE     AUA Symptom Score (11/30/2017):   INCOMPLETE EMPTYING: How often have you had the sensation of not emptying your bladder?: Not at all  FREQUENCY: How often do you have to urinate less than every two hours?: Not at all  INTERMITTENCY: How often have you found you stopped and started again several times when you urinated?: Not at all  URGENCY: How often have you found it difficult to postpone urination?: Not at all  WEAK STREAM: How often have you had a weak urinary stream?: Not at all  STRAINING: How often have you had to strain to start  urination?: Not at all  NOCTURIA: How many times did you typically get up at night to uriniate?: 5 Times  TOTAL I-PSS SCORE[de-identified] 5  How would you feel if you were to spend the rest of your life with your urinary condition?: Terrible     Last BUN and creatinine:  No results found for: BUN  No results found for: CREATININE     Additional Lab/Culture results: none     Imaging Reviewed during this Office Visit: none        (results were independently reviewed by physician and radiology report verified)     PAST MEDICAL, FAMILY AND SOCIAL HISTORY UPDATE:  Past Medical History Past Medical History:   Diagnosis Date    Hypertension      Lumbar spinal stenosis           Past Surgical History         Past Surgical History:   Procedure Laterality Date    LUMBAR LAMINECTOMY   09/05/2017     L2-3, L3-4    ROTATOR CUFF REPAIR Left       x2          Family History         Family History   Problem Relation Age of Onset    High Blood Pressure Mother      High Blood Pressure Father      High Blood Pressure Brother           Active Medications          Outpatient Prescriptions Marked as Taking for the 11/30/17 encounter (Office Visit) with Tony Dial MD   Medication Sig Dispense Refill    phenazopyridine (PYRIDIUM) 200 MG tablet Take 1 tablet by mouth 3 times daily as needed for Pain If not cost effective direct pt to OTC version and instruct on dosing. 9 tablet 0    tiZANidine (ZANAFLEX) 2 MG tablet Take 1 tablet by mouth nightly 30 tablet 0    losartan (COZAAR) 100 MG tablet Take 100 mg by mouth daily        metoprolol tartrate (LOPRESSOR) 50 MG tablet Take 50 mg by mouth daily        mometasone-formoterol (DULERA) 200-5 MCG/ACT inhaler Inhale 2 puffs into the lungs as needed        albuterol sulfate HFA (PROAIR HFA) 108 (90 Base) MCG/ACT inhaler Inhale 1 puff into the lungs as needed        LORazepam (ATIVAN) 0.5 MG tablet Take 0.5 mg by mouth every 6 hours as needed        Multiple Vitamins TABS Take 1 tablet by mouth daily        tamsulosin (FLOMAX) 0.4 MG capsule Take 2 tablets by mouth daily 60 capsule 1          Prednisone      History   Smoking Status    Never Smoker   Smokeless Tobacco    Never Used      (If patient a smoker, smoking cessation counseling offered)         History   Alcohol Use    Yes       Comment: socially         REVIEW OF SYSTEMS:  Review of Systems   Constitutional: Negative for appetite change, chills and fever. Eyes: Negative for pain, redness and visual disturbance. Respiratory: Negative for cough, shortness of breath and wheezing.

## 2017-12-01 NOTE — ANESTHESIA PRE PROCEDURE
List   Diagnosis Code    Postlaminectomy syndrome, lumbar region M96.1    Bilateral sacroiliitis ([facility].) M46.1       Past Medical History:        Diagnosis Date    Abnormal urinary stream     DIFFICULTY STARTING URINARY STREAM    Arthritis     BILATERAL HANDS    Asthma     Enlarged prostate     [place] (hard of hearing)     RIGHT WORSE THAN LEFT    Hypertension     Irregular heart beat     NO MEDICATIONS FOR THIS    Lumbar spinal stenosis     Nocturia     Wears glasses        Past Surgical History:        Procedure Laterality Date    BACK SURGERY  09/05/2017    L2-3, L3-4    OTHER SURGICAL HISTORY Left     FROZEN SHOULDER MANIPULATION, AFTER 2ND ROTATOR CUFF SURGERY    ROTATOR CUFF REPAIR Left 10/2016, 02/2017    x2     VASECTOMY         Social History:    Social History   Substance Use Topics    Smoking status: Never Smoker    Smokeless tobacco: Never Used    Alcohol use 1.2 oz/week     2 Cans of beer per week      Comment: 2-3 TIMES PER WEEK                                Counseling given: No      Vital Signs (Current):   Vitals:    11/30/17 1433   Weight: 163 lb (73.9 kg)   Height: 5' 11\" (1.803 m)                                              BP Readings from Last 3 Encounters:   11/30/17 131/82   11/29/17 104/75   11/28/17 128/78       NPO Status: Time of last liquid consumption: 2300                        Time of last solid consumption: 1730                        Date of last liquid consumption: 11/30/17                        Date of last solid food consumption: 11/30/17    BMI:   Wt Readings from Last 3 Encounters:   11/30/17 163 lb (73.9 kg)   11/30/17 163 lb 6.4 oz (74.1 kg)   11/29/17 165 lb 5.5 oz (75 kg)     Body mass index is 22.73 kg/m².     CBC: No results found for: WBC, RBC, HGB, HCT, MCV, RDW, PLT    CMP: No results found for: NA, K, CL, CO2, BUN, CREATININE, GFRAA, AGRATIO, LABGLOM, GLUCOSE, PROT, CALCIUM, BILITOT, ALKPHOS, AST, ALT    POC Tests: No results for input(s): POCGLU, [place], POCCL, POCBUN, POCHEMO, POCHCT in the last 72 hours. Coags: No results found for: PROTIME, INR, APTT    HCG (If Applicable): No results found for: PREGTESTUR, PREGSERUM, HCG, HCGQUANT     ABGs: No results found for: PHART, PO2ART, BWH4WAQ, YBX0YEM, BEART, H3NXPYYI     Type & Screen (If Applicable):  No results found for: [facility]    Anesthesia Evaluation  Patient summary reviewed and Nursing notes reviewed no history of anesthetic complications:   Airway: Mallampati: II  TM distance: >3 FB   Neck ROM: full  Mouth opening: > = 3 FB Dental: normal exam         Pulmonary:normal exam    (+) asthma:                            Cardiovascular:  Exercise tolerance: good (>4 METS),   (+) hypertension:, dysrhythmias:,     (-)  angina,  CHF and orthopnea    ECG reviewed      Echocardiogram reviewed         Beta Blocker:  Not on Beta Blocker         Neuro/Psych:   Negative Neuro/Psych ROS              GI/Hepatic/Renal:        (-) GERD, PUD and bowel prep       Endo/Other:    (+) : arthritis:., .                 Abdominal:           Vascular: negative vascular ROS. Anesthesia Plan      general     ASA 2     (GA LMA)  Induction: intravenous. MIPS: Postoperative opioids intended and Prophylactic antiemetics administered. Anesthetic plan and risks discussed with patient.       Plan discussed with CRNA and surgical team.                  Neo Belcher MD   12/1/2017

## 2017-12-04 ENCOUNTER — HOSPITAL ENCOUNTER (OUTPATIENT)
Dept: PAIN MANAGEMENT | Age: 67
Discharge: HOME | End: 2017-12-04
Payer: MEDICARE

## 2017-12-04 ENCOUNTER — PROCEDURE VISIT (OUTPATIENT)
Dept: UROLOGY | Age: 67
End: 2017-12-04

## 2017-12-04 VITALS
WEIGHT: 163 LBS | HEIGHT: 71 IN | DIASTOLIC BLOOD PRESSURE: 66 MMHG | SYSTOLIC BLOOD PRESSURE: 92 MMHG | TEMPERATURE: 98.5 F | BODY MASS INDEX: 22.82 KG/M2 | HEART RATE: 70 BPM

## 2017-12-04 DIAGNOSIS — R97.20 ELEVATED PSA: Primary | ICD-10-CM

## 2017-12-04 PROCEDURE — 99999 PR OFFICE/OUTPT VISIT,PROCEDURE ONLY: CPT | Performed by: UROLOGY

## 2017-12-13 ENCOUNTER — HOSPITAL ENCOUNTER (OUTPATIENT)
Dept: PAIN MANAGEMENT | Age: 67
Discharge: HOME | End: 2017-12-13
Payer: MEDICARE

## 2017-12-13 VITALS
HEART RATE: 85 BPM | TEMPERATURE: 96.4 F | SYSTOLIC BLOOD PRESSURE: 136 MMHG | DIASTOLIC BLOOD PRESSURE: 82 MMHG | OXYGEN SATURATION: 96 % | RESPIRATION RATE: 14 BRPM

## 2017-12-13 DIAGNOSIS — M54.9 CHRONIC BACK PAIN, UNSPECIFIED BACK LOCATION, UNSPECIFIED BACK PAIN LATERALITY: ICD-10-CM

## 2017-12-13 DIAGNOSIS — G89.29 CHRONIC BACK PAIN, UNSPECIFIED BACK LOCATION, UNSPECIFIED BACK PAIN LATERALITY: ICD-10-CM

## 2017-12-13 PROCEDURE — 6360000002 HC RX W HCPCS

## 2017-12-13 PROCEDURE — G0260 INJ FOR SACROILIAC JT ANESTH: HCPCS

## 2017-12-13 PROCEDURE — 6360000002 HC RX W HCPCS: Performed by: ANESTHESIOLOGY

## 2017-12-13 PROCEDURE — 27096 INJECT SACROILIAC JOINT: CPT | Performed by: ANESTHESIOLOGY

## 2017-12-13 PROCEDURE — 2500000003 HC RX 250 WO HCPCS

## 2017-12-13 PROCEDURE — 2580000003 HC RX 258: Performed by: ANESTHESIOLOGY

## 2017-12-13 RX ORDER — FENTANYL CITRATE 50 UG/ML
50 INJECTION, SOLUTION INTRAMUSCULAR; INTRAVENOUS PRN
Status: DISCONTINUED | OUTPATIENT
Start: 2017-12-13 | End: 2017-12-14 | Stop reason: HOSPADM

## 2017-12-13 RX ORDER — LIDOCAINE HYDROCHLORIDE 10 MG/ML
0.3 INJECTION, SOLUTION EPIDURAL; INFILTRATION; INTRACAUDAL; PERINEURAL ONCE
Status: DISCONTINUED | OUTPATIENT
Start: 2017-12-13 | End: 2017-12-14 | Stop reason: HOSPADM

## 2017-12-13 RX ORDER — SODIUM CHLORIDE, SODIUM LACTATE, POTASSIUM CHLORIDE, CALCIUM CHLORIDE 600; 310; 30; 20 MG/100ML; MG/100ML; MG/100ML; MG/100ML
500 INJECTION, SOLUTION INTRAVENOUS CONTINUOUS
Status: DISCONTINUED | OUTPATIENT
Start: 2017-12-13 | End: 2017-12-14 | Stop reason: HOSPADM

## 2017-12-13 RX ORDER — DIPHENHYDRAMINE HYDROCHLORIDE 50 MG/ML
25 INJECTION INTRAMUSCULAR; INTRAVENOUS ONCE
Qty: 0.5 ML | Refills: 0 | Status: SHIPPED | OUTPATIENT
Start: 2017-12-13 | End: 2017-12-13

## 2017-12-13 RX ORDER — MIDAZOLAM HYDROCHLORIDE 1 MG/ML
0.5 INJECTION INTRAMUSCULAR; INTRAVENOUS 4 TIMES DAILY PRN
Status: DISCONTINUED | OUTPATIENT
Start: 2017-12-13 | End: 2017-12-14 | Stop reason: HOSPADM

## 2017-12-13 RX ADMIN — SODIUM CHLORIDE, POTASSIUM CHLORIDE, SODIUM LACTATE AND CALCIUM CHLORIDE 500 ML: 600; 310; 30; 20 INJECTION, SOLUTION INTRAVENOUS at 09:43

## 2017-12-13 RX ADMIN — FENTANYL CITRATE 50 MCG: 50 INJECTION INTRAMUSCULAR; INTRAVENOUS at 09:57

## 2017-12-13 RX ADMIN — MIDAZOLAM HYDROCHLORIDE 1 MG: 1 INJECTION, SOLUTION INTRAMUSCULAR; INTRAVENOUS at 09:57

## 2017-12-13 ASSESSMENT — PAIN - FUNCTIONAL ASSESSMENT
PAIN_FUNCTIONAL_ASSESSMENT: 0-10

## 2017-12-13 ASSESSMENT — PAIN DESCRIPTION - DESCRIPTORS: DESCRIPTORS: CONSTANT;STABBING

## 2017-12-14 NOTE — PROCEDURES
dictation, the patient is  doing well. I anticipate discharge from the service in about half hour. His ASA classification is 3 and Mallampati score 2. We will follow up with  him.         Yas Leger    D: 12/13/2017 10:24:01       T: 12/13/2017 12:16:31     [email or web address]  Job#: [id number]     Doc#: [id number]    CC:  Barbara Daniels

## 2017-12-21 ENCOUNTER — OFFICE VISIT (OUTPATIENT)
Dept: UROLOGY | Age: 67
End: 2017-12-21
Payer: MEDICARE

## 2017-12-21 VITALS
BODY MASS INDEX: 22.09 KG/M2 | SYSTOLIC BLOOD PRESSURE: 122 MMHG | DIASTOLIC BLOOD PRESSURE: 75 MMHG | TEMPERATURE: 98.2 F | HEART RATE: 75 BPM | HEIGHT: 71 IN | WEIGHT: 157.8 LBS

## 2017-12-21 DIAGNOSIS — R33.8 BENIGN PROSTATIC HYPERPLASIA WITH URINARY RETENTION: ICD-10-CM

## 2017-12-21 DIAGNOSIS — R35.1 NOCTURIA: ICD-10-CM

## 2017-12-21 DIAGNOSIS — R33.9 INCOMPLETE EMPTYING OF BLADDER: Primary | ICD-10-CM

## 2017-12-21 DIAGNOSIS — N40.1 BENIGN PROSTATIC HYPERPLASIA WITH URINARY RETENTION: ICD-10-CM

## 2017-12-21 PROCEDURE — 99024 POSTOP FOLLOW-UP VISIT: CPT | Performed by: NURSE PRACTITIONER

## 2017-12-21 PROCEDURE — 51798 US URINE CAPACITY MEASURE: CPT | Performed by: NURSE PRACTITIONER

## 2017-12-21 RX ORDER — M-VIT,TX,IRON,MINS/CALC/FOLIC 27MG-0.4MG
1 TABLET ORAL DAILY
COMMUNITY

## 2017-12-21 ASSESSMENT — ENCOUNTER SYMPTOMS
WHEEZING: 0
BACK PAIN: 1
EYE REDNESS: 0
EYE PAIN: 0
COLOR CHANGE: 0
VOMITING: 0
COUGH: 0
SHORTNESS OF BREATH: 0
ABDOMINAL PAIN: 0
NAUSEA: 0

## 2017-12-21 NOTE — PROGRESS NOTES
[facility]  [facility] [place]  [address]  [address]  [address] [facility] [address]  Dept: [address] [facility] Urology Office Note - Established    Patient:  Josemanuel Kendall  YOB: 1950  Date: 12/21/2017    The patient is a 78 y.o. male who presents today for evaluation of the following problems:   Chief Complaint   Patient presents with    Post-Op Check     PVR        HPI  Here post GL. Nocturia down from 5x to 2x. He feels he empties well, is on Flomax and prefers to wean slowly. .     Summary of old records: N/A    Additional History: N/A    Procedures Today: PVR 264ml per us   Urinalysis today:  No results found for this visit on 12/21/17. Last several PSA's:  Lab Results   Component Value Date    PSA 0.45 10/06/2017     Last total testosterone:  No results found for: TESTOSTERONE    AUA Symptom Score (12/21/2017):   INCOMPLETE EMPTYING: How often have you had the sensation of not emptying your bladder?: Not at all  FREQUENCY: How often do you have to urinate less than every two hours?: Not at all  INTERMITTENCY: How often have you found you stopped and started again several times when you urinated?: Not at all  URGENCY: How often have you found it difficult to postpone urination?: Not at all  WEAK STREAM: How often have you had a weak urinary stream?: Not at all  STRAINING: How often have you had to strain to start  urination?: Not at all  NOCTURIA: How many times did you typically get up at night to uriniate?: 2 Times  TOTAL I-PSS SCORE[de-identified] 2  How would you feel if you were to spend the rest of your life with your urinary condition?: Mostly Satisfied    Last BUN and creatinine:  No results found for: BUN  No results found for: CREATININE    Additional Lab/Culture results: none    Imaging Reviewed during this Office Visit: none  (results were independently reviewed by physician and radiology report verified)    PAST MEDICAL, FAMILY AND SOCIAL HISTORY UPDATE:  Past Medical History:   Diagnosis Date    Abnormal urinary stream     DIFFICULTY STARTING URINARY STREAM    Arthritis     BILATERAL HANDS    Asthma     Enlarged prostate     [place] (hard of hearing)     RIGHT WORSE THAN LEFT    Hypertension     Irregular heart beat     NO MEDICATIONS FOR THIS    Lumbar spinal stenosis     Nocturia     Wears glasses      Past Surgical History:   Procedure Laterality Date    BACK SURGERY  09/05/2017    L2-3, L3-4    CYSTOSCOPY  12/01/2017    NERVE BLOCK Bilateral 12/13/2017    bilat SI #1 decadron 10mg    OTHER SURGICAL HISTORY Left     FROZEN SHOULDER MANIPULATION, AFTER 2ND ROTATOR CUFF SURGERY    PROSTATE SURGERY  12/01/2017    greenlight laser    ROTATOR CUFF REPAIR Left 10/2016, 02/2017    x2     TURP N/A 12/1/2017    CYSTOSCOPY GREENLIGHT XPS Terence Nunez CONF# [id number] - [facility] performed by Karan Lyon MD at [place] [address]       Family History   Problem Relation Age of Onset    High Blood Pressure Mother     Heart Disease Mother     High Blood Pressure Father     Heart Disease Father     Other Father      HEART VALVE REPAIRED    High Blood Pressure Brother      Outpatient Prescriptions Marked as Taking for the 12/21/17 encounter (Office Visit) with Mark Schultz CNP   Medication Sig Dispense Refill    Multiple Vitamins-Minerals (THERAPEUTIC MULTIVITAMIN-MINERALS) tablet Take 1 tablet by mouth daily      docusate sodium (COLACE) 100 MG capsule Take 1 capsule by mouth 2 times daily 14 capsule 0    tamsulosin (FLOMAX) 0.4 MG capsule Take 1 capsule by mouth daily (Patient taking differently: Take 0.4 mg by mouth 2 times daily ) 90 capsule 3    losartan (COZAAR) 100 MG tablet Take 100 mg by mouth daily      metoprolol tartrate (LOPRESSOR) 50 MG tablet Take 25 mg by mouth 2 times daily       mometasone-formoterol (DULERA) 200-5 MCG/ACT inhaler Inhale 2 puffs into the lungs 2 times daily       albuterol sulfate HFA (PROAIR HFA) 108 (90 Base) MCG/ACT inhaler Plan      1. Incomplete emptying of bladder    2. Benign prostatic hyperplasia with urinary retention    3. Nocturia           Plan:    wean off Flomax and discontinue completely    Follow up with Dr Mayer Dears as scheduled    Decrease fluids 2 hours before bed. Call with questions or concerns sooner. .   Return in about 3 months (around 3/21/2018) for as scheduled post op visit. Prescriptions Ordered:  No orders of the defined types were placed in this encounter.      Orders Placed:  Orders Placed This Encounter   Procedures    MT MEASUREMENT,POST-VOID RESIDUAL VOLUME BY US,NON-IMAGING          Andrea Fox, CNP

## 2017-12-21 NOTE — PATIENT INSTRUCTIONS
Doing well post Greenlight- wean off Flomax and discontinue completely    Follow up with Dr Kameron Posey as scheduled    Decrease fluids 2 hours before bed. Call with questions or concerns sooner. Kwan Price

## 2017-12-27 ENCOUNTER — HOSPITAL ENCOUNTER (OUTPATIENT)
Dept: PAIN MANAGEMENT | Age: 67
Discharge: HOME | End: 2017-12-27
Payer: MEDICARE

## 2017-12-27 VITALS
HEART RATE: 82 BPM | DIASTOLIC BLOOD PRESSURE: 78 MMHG | RESPIRATION RATE: 18 BRPM | OXYGEN SATURATION: 97 % | SYSTOLIC BLOOD PRESSURE: 140 MMHG | HEIGHT: 71 IN | WEIGHT: 157 LBS | TEMPERATURE: 98 F | BODY MASS INDEX: 21.98 KG/M2

## 2017-12-27 DIAGNOSIS — G89.29 CHRONIC BACK PAIN, UNSPECIFIED BACK LOCATION, UNSPECIFIED BACK PAIN LATERALITY: ICD-10-CM

## 2017-12-27 DIAGNOSIS — M46.1 BILATERAL SACROILIITIS (HCC): Primary | Chronic | ICD-10-CM

## 2017-12-27 DIAGNOSIS — M54.9 CHRONIC BACK PAIN, UNSPECIFIED BACK LOCATION, UNSPECIFIED BACK PAIN LATERALITY: ICD-10-CM

## 2017-12-27 PROCEDURE — 64493 INJ PARAVERT F JNT L/S 1 LEV: CPT

## 2017-12-27 PROCEDURE — 64493 INJ PARAVERT F JNT L/S 1 LEV: CPT | Performed by: ANESTHESIOLOGY

## 2017-12-27 PROCEDURE — 64494 INJ PARAVERT F JNT L/S 2 LEV: CPT | Performed by: ANESTHESIOLOGY

## 2017-12-27 PROCEDURE — 6360000002 HC RX W HCPCS: Performed by: ANESTHESIOLOGY

## 2017-12-27 PROCEDURE — 2500000003 HC RX 250 WO HCPCS

## 2017-12-27 PROCEDURE — 64494 INJ PARAVERT F JNT L/S 2 LEV: CPT

## 2017-12-27 PROCEDURE — 2500000003 HC RX 250 WO HCPCS: Performed by: ANESTHESIOLOGY

## 2017-12-27 PROCEDURE — 2580000003 HC RX 258: Performed by: ANESTHESIOLOGY

## 2017-12-27 RX ORDER — SODIUM CHLORIDE, SODIUM LACTATE, POTASSIUM CHLORIDE, CALCIUM CHLORIDE 600; 310; 30; 20 MG/100ML; MG/100ML; MG/100ML; MG/100ML
500 INJECTION, SOLUTION INTRAVENOUS CONTINUOUS
Status: DISCONTINUED | OUTPATIENT
Start: 2017-12-27 | End: 2017-12-28 | Stop reason: HOSPADM

## 2017-12-27 RX ORDER — LIDOCAINE HYDROCHLORIDE 10 MG/ML
5 INJECTION, SOLUTION EPIDURAL; INFILTRATION; INTRACAUDAL; PERINEURAL ONCE
Status: COMPLETED | OUTPATIENT
Start: 2017-12-27 | End: 2017-12-27

## 2017-12-27 RX ORDER — MIDAZOLAM HYDROCHLORIDE 1 MG/ML
0.5 INJECTION INTRAMUSCULAR; INTRAVENOUS
Status: DISCONTINUED | OUTPATIENT
Start: 2017-12-27 | End: 2017-12-28 | Stop reason: HOSPADM

## 2017-12-27 RX ORDER — FENTANYL CITRATE 50 UG/ML
25 INJECTION, SOLUTION INTRAMUSCULAR; INTRAVENOUS
Status: DISCONTINUED | OUTPATIENT
Start: 2017-12-27 | End: 2017-12-28 | Stop reason: HOSPADM

## 2017-12-27 RX ADMIN — LIDOCAINE HYDROCHLORIDE 5 ML: 10 INJECTION, SOLUTION EPIDURAL; INFILTRATION; INTRACAUDAL; PERINEURAL at 09:38

## 2017-12-27 RX ADMIN — SODIUM CHLORIDE, POTASSIUM CHLORIDE, SODIUM LACTATE AND CALCIUM CHLORIDE 500 ML: 600; 310; 30; 20 INJECTION, SOLUTION INTRAVENOUS at 09:37

## 2017-12-27 RX ADMIN — MIDAZOLAM HYDROCHLORIDE 1 MG: 1 INJECTION, SOLUTION INTRAMUSCULAR; INTRAVENOUS at 09:54

## 2017-12-27 ASSESSMENT — ACTIVITIES OF DAILY LIVING (ADL): EFFECT OF PAIN ON DAILY ACTIVITIES: 4/5

## 2017-12-27 ASSESSMENT — PAIN DESCRIPTION - DESCRIPTORS: DESCRIPTORS: BURNING;JABBING;SHARP

## 2017-12-27 ASSESSMENT — PAIN - FUNCTIONAL ASSESSMENT
PAIN_FUNCTIONAL_ASSESSMENT: 0-10
PAIN_FUNCTIONAL_ASSESSMENT: 0-10

## 2017-12-28 NOTE — OP NOTE
location,  one other consideration can be perhaps a periosteal injection at this area,  and if that does not relieve it, perhaps to target the superior part of the  SI joint. I still think perhaps treating one side at a time might give us  a better diagnostic understanding.         Sam Becerra    D: 12/27/2017 10:57:56       T: 12/27/2017 11:00:51     [email or web address]  Job#: [id number]     Doc#: [id number]    CC:  Laura Dodson

## 2018-01-02 ENCOUNTER — HOSPITAL ENCOUNTER (OUTPATIENT)
Dept: PAIN MANAGEMENT | Age: 68
Discharge: HOME | End: 2018-01-02
Payer: MEDICARE

## 2018-01-02 VITALS
WEIGHT: 157 LBS | SYSTOLIC BLOOD PRESSURE: 159 MMHG | BODY MASS INDEX: 21.98 KG/M2 | RESPIRATION RATE: 16 BRPM | TEMPERATURE: 97.5 F | HEART RATE: 87 BPM | DIASTOLIC BLOOD PRESSURE: 85 MMHG | HEIGHT: 71 IN

## 2018-01-02 DIAGNOSIS — M46.1 BILATERAL SACROILIITIS (HCC): Primary | Chronic | ICD-10-CM

## 2018-01-02 PROCEDURE — 99214 OFFICE O/P EST MOD 30 MIN: CPT

## 2018-01-02 PROCEDURE — 99213 OFFICE O/P EST LOW 20 MIN: CPT | Performed by: ANESTHESIOLOGY

## 2018-01-02 ASSESSMENT — PAIN DESCRIPTION - PAIN TYPE: TYPE: CHRONIC PAIN

## 2018-01-02 ASSESSMENT — PAIN DESCRIPTION - ORIENTATION: ORIENTATION: RIGHT

## 2018-01-02 ASSESSMENT — PAIN DESCRIPTION - FREQUENCY: FREQUENCY: CONTINUOUS

## 2018-01-02 ASSESSMENT — PAIN DESCRIPTION - LOCATION: LOCATION: BACK

## 2018-01-02 ASSESSMENT — PAIN SCALES - GENERAL: PAINLEVEL_OUTOF10: 9

## 2018-01-02 ASSESSMENT — PAIN DESCRIPTION - PROGRESSION: CLINICAL_PROGRESSION: GRADUALLY WORSENING

## 2018-01-02 ASSESSMENT — PAIN DESCRIPTION - ONSET: ONSET: PROGRESSIVE

## 2018-01-02 ASSESSMENT — PAIN DESCRIPTION - DESCRIPTORS: DESCRIPTORS: BURNING;CONSTANT;JABBING

## 2018-01-02 NOTE — PROGRESS NOTES
inhaler Inhale 1 puff into the lungs as needed         Are your Current Pain medication (s) managing the pain  No    Other Issues na    I have written this patient information acting as a scribe for Dr Cruzito Ash    Electronically signed by Nanda Leal RN on 1/2/2018 at 9:10 AM

## 2018-01-09 ENCOUNTER — HOSPITAL ENCOUNTER (OUTPATIENT)
Dept: PAIN MANAGEMENT | Age: 68
Discharge: HOME | End: 2018-01-09
Payer: MEDICARE

## 2018-01-09 VITALS
RESPIRATION RATE: 16 BRPM | SYSTOLIC BLOOD PRESSURE: 125 MMHG | TEMPERATURE: 96.8 F | HEART RATE: 81 BPM | OXYGEN SATURATION: 97 % | DIASTOLIC BLOOD PRESSURE: 80 MMHG

## 2018-01-09 DIAGNOSIS — M54.9 CHRONIC BACK PAIN, UNSPECIFIED BACK LOCATION, UNSPECIFIED BACK PAIN LATERALITY: ICD-10-CM

## 2018-01-09 DIAGNOSIS — G89.29 CHRONIC BACK PAIN, UNSPECIFIED BACK LOCATION, UNSPECIFIED BACK PAIN LATERALITY: ICD-10-CM

## 2018-01-09 PROCEDURE — 2580000003 HC RX 258: Performed by: ANESTHESIOLOGY

## 2018-01-09 PROCEDURE — 6360000002 HC RX W HCPCS: Performed by: ANESTHESIOLOGY

## 2018-01-09 PROCEDURE — 27096 INJECT SACROILIAC JOINT: CPT | Performed by: ANESTHESIOLOGY

## 2018-01-09 PROCEDURE — G0260 INJ FOR SACROILIAC JT ANESTH: HCPCS

## 2018-01-09 RX ORDER — SODIUM CHLORIDE, SODIUM LACTATE, POTASSIUM CHLORIDE, CALCIUM CHLORIDE 600; 310; 30; 20 MG/100ML; MG/100ML; MG/100ML; MG/100ML
INJECTION, SOLUTION INTRAVENOUS CONTINUOUS
Status: DISCONTINUED | OUTPATIENT
Start: 2018-01-09 | End: 2018-01-10 | Stop reason: HOSPADM

## 2018-01-09 RX ORDER — MIDAZOLAM HYDROCHLORIDE 1 MG/ML
0.5 INJECTION INTRAMUSCULAR; INTRAVENOUS 3 TIMES DAILY PRN
Status: DISCONTINUED | OUTPATIENT
Start: 2018-01-09 | End: 2018-01-10 | Stop reason: HOSPADM

## 2018-01-09 RX ORDER — DIPHENHYDRAMINE HYDROCHLORIDE 50 MG/ML
25 INJECTION INTRAMUSCULAR; INTRAVENOUS EVERY 6 HOURS PRN
Status: DISCONTINUED | OUTPATIENT
Start: 2018-01-09 | End: 2018-01-10 | Stop reason: HOSPADM

## 2018-01-09 RX ORDER — FENTANYL CITRATE 50 UG/ML
50 INJECTION, SOLUTION INTRAMUSCULAR; INTRAVENOUS
Status: DISCONTINUED | OUTPATIENT
Start: 2018-01-09 | End: 2018-01-10 | Stop reason: HOSPADM

## 2018-01-09 RX ORDER — TIZANIDINE 2 MG/1
2 TABLET ORAL NIGHTLY
Qty: 30 TABLET | Refills: 0 | Status: SHIPPED | OUTPATIENT
Start: 2018-01-09 | End: 2018-01-18

## 2018-01-09 RX ADMIN — DIPHENHYDRAMINE HYDROCHLORIDE 25 MG: 50 INJECTION, SOLUTION INTRAMUSCULAR; INTRAVENOUS at 09:02

## 2018-01-09 RX ADMIN — FENTANYL CITRATE 75 MCG: 50 INJECTION, SOLUTION INTRAMUSCULAR; INTRAVENOUS at 08:54

## 2018-01-09 RX ADMIN — SODIUM CHLORIDE, POTASSIUM CHLORIDE, SODIUM LACTATE AND CALCIUM CHLORIDE: 600; 310; 30; 20 INJECTION, SOLUTION INTRAVENOUS at 08:38

## 2018-01-09 RX ADMIN — MIDAZOLAM HYDROCHLORIDE 1.5 MG: 1 INJECTION, SOLUTION INTRAMUSCULAR; INTRAVENOUS at 08:54

## 2018-01-09 ASSESSMENT — PAIN - FUNCTIONAL ASSESSMENT
PAIN_FUNCTIONAL_ASSESSMENT: 0-10

## 2018-01-10 NOTE — OP NOTE
injection under fluoroscopic  guidance. COMPLICATIONS:  There were no immediate clinical complications. PROCEDURE SUMMARY:  After obtaining consent, the patient was taken to the  procedure room. A time-out was called, the procedure was confirmed. IV  was started, and he was placed on appropriate monitors. IV sedation was  given with 1.5 mg of Versed and 1.5 mL of fentanyl. He was placed in the  prone position with one pillow under the abdomen on the fluoro table. His  bilateral lumbosacral area was prepped with ChloraPrep, and appropriate  drapes were placed. Under oblique view of fluoroscopy, the right SI joint  was opened up and was marked. Same thing was done on the left side too. Now, local anesthesia was infiltrated with a total of 10 mL of 0.25%  Marcaine with epinephrine and 5 mL of 1% lidocaine using a #25 needle into  the middle of the joint, upper one-third and lower one-third of the joint  bilaterally. Now, three #22 spinal needles were slowly advanced under  fluoroscopic guidance into the middle of the joint, upper one-third and  lower one-third of the joint with some difficulty bilaterally. After  negative aspiration, now he was injected with a total of 40 mg of Kenalog  in divided doses bilaterally along with 6 mL of 0.25% Marcaine with  epinephrine bilaterally in divided doses. The patient was taken to the  recovery room in satisfactory condition, and he was discharged after half  an hour with proper discharge instructions. I will have him come back to  the pain clinic after 2 weeks for a followup visit. At the same time, I  have given him Benadryl in case he has itching and also Zanaflex 2 mg at  bedtime.         Sergio JAVIER    D: 01/09/2018 9:49:10       T: 01/09/2018 11:55:35     [email or web address]  Job#: [id number]     Doc#: [id number]    [address] [facility] none

## 2018-01-15 ENCOUNTER — TELEPHONE (OUTPATIENT)
Dept: UROLOGY | Age: 68
End: 2018-01-15

## 2018-01-15 ENCOUNTER — HOSPITAL ENCOUNTER (OUTPATIENT)
Age: 68
Setting detail: SPECIMEN
Discharge: HOME | End: 2018-01-15
Payer: MEDICARE

## 2018-01-15 DIAGNOSIS — R30.0 DYSURIA: Primary | ICD-10-CM

## 2018-01-15 LAB
-: NORMAL
AMORPHOUS: NORMAL
BACTERIA: NORMAL
BILIRUBIN URINE: NEGATIVE
CASTS UA: NORMAL /LPF (ref 0–8)
COLOR: YELLOW
COMMENT UA: ABNORMAL
CRYSTALS, UA: NORMAL /HPF
EPITHELIAL CELLS UA: NORMAL /HPF (ref 0–5)
GLUCOSE URINE: NEGATIVE
KETONES, URINE: NEGATIVE
LEUKOCYTE ESTERASE, URINE: ABNORMAL
MUCUS: NORMAL
NITRITE, URINE: NEGATIVE
OTHER OBSERVATIONS UA: NORMAL
PH UA: 6.5 (ref 5–8)
PROTEIN UA: NEGATIVE
RBC UA: NORMAL /HPF (ref 0–4)
RENAL EPITHELIAL, UA: NORMAL /HPF
SPECIFIC GRAVITY UA: 1.02 (ref 1–1.03)
TRICHOMONAS: NORMAL
TURBIDITY: CLEAR
URINE HGB: ABNORMAL
UROBILINOGEN, URINE: NORMAL
WBC UA: NORMAL /HPF (ref 0–5)
YEAST: NORMAL

## 2018-01-16 LAB
CULTURE: NO GROWTH
CULTURE: NORMAL
Lab: NORMAL
SPECIMEN DESCRIPTION: NORMAL
STATUS: NORMAL

## 2018-01-18 ENCOUNTER — OFFICE VISIT (OUTPATIENT)
Dept: UROLOGY | Age: 68
End: 2018-01-18
Payer: MEDICARE

## 2018-01-18 VITALS
TEMPERATURE: 98.7 F | HEIGHT: 71 IN | WEIGHT: 158.73 LBS | BODY MASS INDEX: 22.22 KG/M2 | SYSTOLIC BLOOD PRESSURE: 139 MMHG | DIASTOLIC BLOOD PRESSURE: 85 MMHG | HEART RATE: 70 BPM

## 2018-01-18 DIAGNOSIS — R33.9 INCOMPLETE EMPTYING OF BLADDER: Primary | ICD-10-CM

## 2018-01-18 DIAGNOSIS — R30.0 DYSURIA: ICD-10-CM

## 2018-01-18 PROCEDURE — 99024 POSTOP FOLLOW-UP VISIT: CPT | Performed by: NURSE PRACTITIONER

## 2018-01-18 PROCEDURE — 81003 URINALYSIS AUTO W/O SCOPE: CPT | Performed by: NURSE PRACTITIONER

## 2018-01-18 PROCEDURE — 51798 US URINE CAPACITY MEASURE: CPT | Performed by: NURSE PRACTITIONER

## 2018-01-18 ASSESSMENT — ENCOUNTER SYMPTOMS
NAUSEA: 0
VOMITING: 0
EYE PAIN: 0
WHEEZING: 0
COLOR CHANGE: 0
COUGH: 0
EYE REDNESS: 0
BACK PAIN: 1
ABDOMINAL PAIN: 0
SHORTNESS OF BREATH: 0

## 2018-01-18 NOTE — PROGRESS NOTES
BP: 139/85   Pulse: 70   Temp: 98.7 °F (37.1 °C)     Body mass index is 22.15 kg/m². Patient is a 78 y.o. male in no acute distress and alert and oriented to person, place and time. Physical Exam  Constitutional: Patient in no acute distress. Neuro: Alert and oriented to person, place and time. Psych: Mood normal, affect normal  Skin: No rash noted  HEENT: Head: Normocephalic and atraumatic  Conjunctivae and EOM are normal. Pupils are equal, round  Nose: Normal  Right External Ear: Normal; Left External Ear: Normal  Mouth: Mucosa Moist  Neck: Supple  Lungs: Respiratory effort is normal  Cardiovascular: Warm & Pink  Abdomen: Soft, non-tender, non-distended. Lymphatics: No palpable lymphadenopathy. Bladder non-tender and not distended.  ml. Musculoskeletal: Normal gait and station      Assessment and Plan      1. Incomplete emptying of bladder    2. Dysuria           Plan:    call in 3-4 weeks with an update on burning, or sooner if needed    Keep sched appt with Dr Shailesh Palmer in March. No Follow-up on file. Prescriptions Ordered:  No orders of the defined types were placed in this encounter.      Orders Placed:  Orders Placed This Encounter   Procedures    POCT Urinalysis No Micro (Auto)    CA MEASUREMENT,POST-VOID RESIDUAL VOLUME BY US,NON-IMAGING          Rosa Santana CNP

## 2018-01-18 NOTE — PATIENT INSTRUCTIONS
call in 3-4 weeks with an update on burning, or sooner if needed     Keep sched appt with Dr Misti Faith in March.

## 2018-01-24 ENCOUNTER — HOSPITAL ENCOUNTER (OUTPATIENT)
Dept: PAIN MANAGEMENT | Age: 68
Discharge: HOME | End: 2018-01-24
Payer: MEDICARE

## 2018-01-24 VITALS — SYSTOLIC BLOOD PRESSURE: 139 MMHG | HEART RATE: 57 BPM | DIASTOLIC BLOOD PRESSURE: 79 MMHG | RESPIRATION RATE: 16 BRPM

## 2018-01-24 DIAGNOSIS — M46.1 SACROILIITIS, NOT ELSEWHERE CLASSIFIED (HCC): Primary | ICD-10-CM

## 2018-01-24 PROCEDURE — 99214 OFFICE O/P EST MOD 30 MIN: CPT

## 2018-01-24 PROCEDURE — 99213 OFFICE O/P EST LOW 20 MIN: CPT | Performed by: ANESTHESIOLOGY

## 2018-01-24 RX ORDER — AMITRIPTYLINE HYDROCHLORIDE 25 MG/1
25 TABLET, FILM COATED ORAL NIGHTLY
Qty: 14 TABLET | Refills: 0 | Status: SHIPPED | OUTPATIENT
Start: 2018-01-24 | End: 2018-03-08 | Stop reason: ALTCHOICE

## 2018-01-24 RX ORDER — OXYCODONE HYDROCHLORIDE 5 MG/1
5 TABLET ORAL EVERY 8 HOURS PRN
Qty: 42 TABLET | Refills: 0 | Status: SHIPPED | OUTPATIENT
Start: 2018-01-24 | End: 2018-02-07

## 2018-01-24 RX ORDER — OXYCODONE HYDROCHLORIDE 5 MG/1
5 TABLET ORAL EVERY 4 HOURS PRN
COMMUNITY
End: 2018-01-24 | Stop reason: SDUPTHER

## 2018-01-24 RX ORDER — MELOXICAM 7.5 MG/1
7.5 TABLET ORAL DAILY
COMMUNITY
End: 2018-01-24 | Stop reason: SDUPTHER

## 2018-01-24 RX ORDER — AMITRIPTYLINE HYDROCHLORIDE 25 MG/1
25 TABLET, FILM COATED ORAL NIGHTLY
COMMUNITY
End: 2018-01-24 | Stop reason: SDUPTHER

## 2018-01-24 RX ORDER — MELOXICAM 7.5 MG/1
7.5 TABLET ORAL EVERY 12 HOURS
Qty: 28 TABLET | Refills: 0 | Status: SHIPPED | OUTPATIENT
Start: 2018-01-24 | End: 2018-03-08 | Stop reason: ALTCHOICE

## 2018-01-24 ASSESSMENT — PAIN DESCRIPTION - ORIENTATION: ORIENTATION: RIGHT;LEFT;LOWER

## 2018-01-24 ASSESSMENT — PAIN DESCRIPTION - PAIN TYPE: TYPE: CHRONIC PAIN

## 2018-01-24 ASSESSMENT — PAIN DESCRIPTION - PROGRESSION: CLINICAL_PROGRESSION: GRADUALLY WORSENING

## 2018-01-24 ASSESSMENT — PAIN DESCRIPTION - FREQUENCY: FREQUENCY: CONTINUOUS

## 2018-01-24 ASSESSMENT — PAIN SCALES - GENERAL: PAINLEVEL_OUTOF10: 10

## 2018-01-24 ASSESSMENT — PAIN DESCRIPTION - LOCATION: LOCATION: BACK

## 2018-01-24 ASSESSMENT — PAIN DESCRIPTION - DESCRIPTORS: DESCRIPTORS: CONSTANT;SHARP

## 2018-01-24 ASSESSMENT — PAIN DESCRIPTION - ONSET: ONSET: ON-GOING

## 2018-01-24 NOTE — PROGRESS NOTES
none    Date of last Pain Intervention  January 9, 2018    Type of Pain Intervention bilateral si joint injections    Was there pain relief from Pain Intervention: Yes    How long was your pain relief from the Pain Intervention 4 hours    What percentage of your pain was relieved  100% to right for 4 hours, 10-20% left for four hours    Working  none    Home Exercises daily walking    When was your last Physical Therapy: October 2017    How often did you go to Physical Therapy :  1-2 days relief    Physical Therapy Results made worse    Are you having any of these Emotional Issues? normal    Are you seeing a Psychiatrist or Psychologist  No     Have you ever had thoughts of hurting yourself  No    Outpatient Prescriptions Marked as Taking for the 1/24/18 encounter [facility] Encounter) with STV PAIN FLUORO RM 1   Medication Sig Dispense Refill    Multiple Vitamins-Minerals (THERAPEUTIC MULTIVITAMIN-MINERALS) tablet Take 1 tablet by mouth daily      docusate sodium (COLACE) 100 MG capsule Take 1 capsule by mouth 2 times daily (Patient taking differently: Take 100 mg by mouth daily ) 14 capsule 0    losartan (COZAAR) 100 MG tablet Take 100 mg by mouth daily      metoprolol tartrate (LOPRESSOR) 50 MG tablet Take 25 mg by mouth 2 times daily       mometasone-formoterol (DULERA) 200-5 MCG/ACT inhaler Inhale 2 puffs into the lungs 2 times daily       albuterol sulfate HFA (PROAIR HFA) 108 (90 Base) MCG/ACT inhaler Inhale 1 puff into the lungs as needed         Are your Current Pain medication (s) managing the pain  Not on meds    Other Issues na    I have written this patient information acting as a scribe for Rajeev Abbasi MD    Electronically signed by Wanda Santiago RN on 1/24/2018 at 1:19 PM

## 2018-01-30 ENCOUNTER — HOSPITAL ENCOUNTER (OUTPATIENT)
Dept: PHYSICAL THERAPY | Age: 68
Setting detail: THERAPIES SERIES
Discharge: HOME | End: 2018-01-30
Payer: MEDICARE

## 2018-01-30 PROCEDURE — 97161 PT EVAL LOW COMPLEX 20 MIN: CPT

## 2018-01-30 ASSESSMENT — PAIN DESCRIPTION - LOCATION: LOCATION: BACK;BUTTOCKS

## 2018-01-30 ASSESSMENT — PAIN DESCRIPTION - PAIN TYPE: TYPE: CHRONIC PAIN

## 2018-01-30 ASSESSMENT — PAIN SCALES - GENERAL: PAINLEVEL_OUTOF10: 9

## 2018-01-30 ASSESSMENT — PAIN DESCRIPTION - PROGRESSION: CLINICAL_PROGRESSION: GRADUALLY WORSENING

## 2018-01-30 ASSESSMENT — PAIN DESCRIPTION - ONSET: ONSET: UNABLE TO TELL

## 2018-01-30 ASSESSMENT — PAIN DESCRIPTION - FREQUENCY: FREQUENCY: CONTINUOUS

## 2018-01-30 ASSESSMENT — PAIN DESCRIPTION - ORIENTATION: ORIENTATION: RIGHT;LEFT

## 2018-01-30 NOTE — PROGRESS NOTES
[facility] Exercise Log  Aquatic, Hip & DLS Program- Phase 1    Date of Eval: 01/30/2018                                Primary PT: Serafin Johnson PT DPT  Diagnosis: Sacroiliitis (M46.1)  Things to Focus On (goals): Pain control, ROM and strength   Surgical Precautions:  Medical Precautions: asthma, HTN, [place], nocturia, lumbar spinal stenosis, irregular heart beat  [] C-9 dates  [] Occ Med   [x] Medicare     Name: Hanover Tinsley  Date        Visit #        Walk F/L/R        Marching        Squats        Step-Ups F/L        Heel-toe raises        SLR F/L/R        Knee/Flex/Ext        F/L Lunges        Kickboard Ex. Iso Abd. Push-pull        Paddling                Balance                        Stretches        Achllies        Hamstring                                .                 Pain Rating

## 2018-01-31 ENCOUNTER — TELEPHONE (OUTPATIENT)
Dept: UROLOGY | Age: 68
End: 2018-01-31

## 2018-02-05 ENCOUNTER — TELEPHONE (OUTPATIENT)
Dept: UROLOGY | Age: 68
End: 2018-02-05

## 2018-02-05 DIAGNOSIS — R30.0 DYSURIA: Primary | ICD-10-CM

## 2018-02-06 ENCOUNTER — HOSPITAL ENCOUNTER (OUTPATIENT)
Age: 68
Setting detail: SPECIMEN
Discharge: HOME | End: 2018-02-06
Payer: MEDICARE

## 2018-02-06 LAB
-: ABNORMAL
AMORPHOUS: ABNORMAL
BACTERIA: ABNORMAL
BILIRUBIN URINE: NEGATIVE
CASTS UA: ABNORMAL /LPF (ref 0–2)
COLOR: YELLOW
COMMENT UA: ABNORMAL
CRYSTALS, UA: ABNORMAL /HPF
EPITHELIAL CELLS UA: ABNORMAL /HPF (ref 0–5)
GLUCOSE URINE: NEGATIVE
KETONES, URINE: NEGATIVE
LEUKOCYTE ESTERASE, URINE: ABNORMAL
MUCUS: ABNORMAL
NITRITE, URINE: NEGATIVE
OTHER OBSERVATIONS UA: ABNORMAL
PH UA: 7.5 (ref 5–8)
PROTEIN UA: NEGATIVE
RBC UA: ABNORMAL /HPF (ref 0–2)
RENAL EPITHELIAL, UA: ABNORMAL /HPF
SPECIFIC GRAVITY UA: 1.01 (ref 1–1.03)
TRICHOMONAS: ABNORMAL
TURBIDITY: CLEAR
URINE HGB: NEGATIVE
UROBILINOGEN, URINE: NORMAL
WBC UA: ABNORMAL /HPF (ref 0–5)
YEAST: ABNORMAL

## 2018-02-07 ENCOUNTER — APPOINTMENT (OUTPATIENT)
Dept: PHYSICAL THERAPY | Age: 68
End: 2018-02-07
Payer: MEDICARE

## 2018-02-07 ENCOUNTER — HOSPITAL ENCOUNTER (OUTPATIENT)
Dept: PAIN MANAGEMENT | Age: 68
Discharge: HOME | End: 2018-02-07

## 2018-02-07 LAB
CULTURE: NO GROWTH
CULTURE: NORMAL
Lab: NORMAL
SPECIMEN DESCRIPTION: NORMAL
STATUS: NORMAL

## 2018-02-08 ENCOUNTER — APPOINTMENT (OUTPATIENT)
Dept: PHYSICAL THERAPY | Age: 68
End: 2018-02-08
Payer: MEDICARE

## 2018-02-08 RX ORDER — CIPROFLOXACIN 500 MG/1
500 TABLET, FILM COATED ORAL 2 TIMES DAILY
Qty: 28 TABLET | Refills: 0 | Status: SHIPPED | OUTPATIENT
Start: 2018-02-08 | End: 2018-02-22

## 2018-02-12 PROCEDURE — 6360000002 HC RX W HCPCS

## 2018-02-14 ENCOUNTER — HOSPITAL ENCOUNTER (OUTPATIENT)
Dept: PHYSICAL THERAPY | Age: 68
Setting detail: THERAPIES SERIES
Discharge: HOME | End: 2018-02-14
Payer: MEDICARE

## 2018-02-14 PROCEDURE — G8979 MOBILITY GOAL STATUS: HCPCS

## 2018-02-14 PROCEDURE — 97161 PT EVAL LOW COMPLEX 20 MIN: CPT

## 2018-02-14 PROCEDURE — G8978 MOBILITY CURRENT STATUS: HCPCS

## 2018-02-14 ASSESSMENT — PAIN DESCRIPTION - PROGRESSION: CLINICAL_PROGRESSION: GRADUALLY WORSENING

## 2018-02-14 ASSESSMENT — PAIN DESCRIPTION - ONSET: ONSET: UNABLE TO TELL

## 2018-02-14 ASSESSMENT — PAIN DESCRIPTION - PAIN TYPE: TYPE: CHRONIC PAIN

## 2018-02-14 ASSESSMENT — PAIN DESCRIPTION - LOCATION: LOCATION: BACK;BUTTOCKS

## 2018-02-14 ASSESSMENT — PAIN SCALES - GENERAL: PAINLEVEL_OUTOF10: 9

## 2018-02-14 ASSESSMENT — PAIN DESCRIPTION - ORIENTATION: ORIENTATION: RIGHT;LEFT

## 2018-02-14 ASSESSMENT — PAIN DESCRIPTION - FREQUENCY: FREQUENCY: CONTINUOUS

## 2018-02-14 NOTE — PROGRESS NOTES
Physical Therapy  Initial Assessment  Date: 2018  Patient Name: Eduardo Wang  MRN: [id number]  : 1950     Treatment Diagnosis: Difficulty Walking     General  Patient assessed for rehabilitation services?: Yes  Response To Previous Treatment: Not applicable  Family / Caregiver Present: Yes (Spouse)  Referring Practitioner: Kaleigh De Luna MD   Referral Date : 18  Diagnosis: Sacroiliitis, not elsewhere classified, (M46.1)  Follows Commands: Within Functional Limits  General Comment  Comments: History of SI pain/ongoing for numerous years - cause directly unknown. Pain comes and goes (approx 4 times a year) over the last few years. History of pan injections/ elected to have surgery. Back surgery 2017/low back laminectomy L2-3 and L3-L4 (unsuccessful). Aquatic therapy Oct 2017 - 3 treatment sessions attended -pain increased and physician advised him he could stop. Dec 2017/2018 - diagnostic injections (unsuccessful). Outpt PT ordered. PT Visit Information  Onset Date: 18  PT Insurance Information: Medicare   Total # of Visits Approved: 12  Total # of Visits to Date: 1  No Show: 0  Canceled Appointment: 0    Subjective  Pain Screening  Patient Currently in Pain: Yes  Pain Assessment  Pain Assessment: 0-10  Pain Level: 9  Pain Type: Chronic pain  Pain Location: Back;Buttocks  Pain Orientation: Right;Left ((R) worse than the (L))  Pain Descriptors: Constant; Shooting; Stabbing;Burning;Aching; Sharp  Pain Frequency: Continuous  Pain Onset: Unable to tell  Clinical Progression: Gradually worsening  Effect of Pain on Daily Activities: Difficulty sleeping - all positions/\"pain\" wakes him at night - difficulty getting back to sleep or falling asleep. Standing/ambulation, bending/stooping, squatting  Patient's Stated Pain Goal: No pain  Pain Intervention(s): Repositioned; Rest  Multiple Pain Sites: No  Vision/Hearing  Hearing  Hearing: Exceptions to [facility]  Hearing Exceptions: Hard of hearing/hearing concerns  Orientation  Overall Orientation Status: Within Normal Limits  Social/Functional History  Additional Comments: Currently not working due to his current condition. Objective  Observation/Palpation  Palpation: No tenderness to the touch was noted within the lumbar region/sacral region   Observation: Forward head, rounded shoulder(s) kyphotic posture  Range of Motion  AROM RLE (degrees)  RLE AROM: Exceptions  R Hip Flexion 0-125: 90  R Hip Extension 0-10: 5  R Hip ABduction 0-45: 20  R Hip ADduction 0-10: 10  R Hip External Rotation 0-45: WNL  R Hip Internal Rotation 0-45: WNL  R Knee Flexion 0-145: WNL  R Knee Extension 0: WNL  R Ankle Dorsiflexion 0-20: WNL  R Ankle Plantar Flexion 0-45: WNL  R Ankle Forefoot Inversion 0-40: WNL  R Ankle Forefoot Eversion 0-20: WNL  AROM LLE (degrees)  LLE AROM : Exceptions  L Hip Flexion 0-125: 90  L Hip Extension 0-10: 5  L Hip ABduction 0-45: 20  L Hip ADduction 0-10: 10  L Hip External Rotation 0-45: WNL  L Hip Internal Rotation 0-45: WNL  L Knee Flexion 0-145: WNL  L Knee Extension 0: WNL  L Ankle Dorsiflexion 0-20: WNL  L Ankle Plantar Flexion 0-45: WNL  L Ankle Forefoot Inversion 0-40: WNL  L Ankle Forefoot Eversion 0-20: WNL  Spine  Lumbar: Flexion - mid femur, (B) lateral flexion - mid femur, (B) rotation - lacking 90%   Strength  Strength RLE  Strength RLE: Exception  R Hip Flexion: 2-/5  R Hip Extension: 2-/5  R Hip ABduction: 2-/5  R Hip ADduction: 2/5  R Knee Flexion: 2/5 (Pain reported at end range)  R Knee Extension: 3/5 (Pain at end range reported )  R Ankle Dorsiflexion: 5/5  R Ankle Plantar flexion: 5/5  Strength LLE  Strength LLE: Exception  Comment: Pt did not wish to try and lay prone/approx 5 weeks he had a pain injection in a prone position/caused severe pain to occur.    L Hip Flexion: 2-/5  L Hip Extension: 2-/5  L Hip ABduction: 2-/5  L Hip ADduction: 2/5  L Knee Flexion: 2/5 (Pain reported at end range )  L Knee Extension: 3/5 (Pain at end Frame for Long term goals : 12 visits   Long term goal 1: Pt will report an average pain level of a 2-3/10 within the sacral region at rest/ADLs. Long term goal 2: Pt will demonstrate full ROM within all involved planes to assist with (I) function. Long term goal 3: Pt will demonstrate a 3-4/5 MMT within all involved planes to assist with (I) function   Long term goal 4: Pt will be able to perform all of his desired ADLs with less pain/limitation. Patient Goals:  Relief so I can do everyday activities     Comments/Assessment: Pt would benefit from physical therapy to assist with pain control, ROM and strength for his sacral region to allow him to perform his ADLs with less pain/limitation. Rehab Potential:  [x] Good  [] Fair  [] Poor   Suggested Professional Referral:  [x] No  [] Yes:  Barriers to Goal Achievement:  [x] No  [] Yes:  Domestic Concerns:  [x] No  [] Yes:    Treatment Plan:  [] Therapeutic Exercise    [] Modalities:  [] Therapeutic Activity              [] Ultrasound  [] Electrical Stimulation  [] Gait Training     []Massage       [] Lumbar/Cervical Traction  [] Neuromuscular Re-education [] Cold/hot pack [] Other:  [x] Instruction in HEP     [] Work Conditioning  [] Manual Therapy             [x] Aquatic Therapy               [] Iontophoresis: 4 mg/mL      Dexamethasone Sodium      Phosphate 40-80mAmin     Frequency:     2      X/wk x     6     wk's      [x] Plans/Goals, Risk/Benefits discussed with pt/family  Comprehension of Education [] yes  [] Needs Review  Pt/Family Education: [] Verbal  [] Demo  [] Written    More objective information is available upon request.  Thank you for this referral.        Medicare/Regulatory Requirements:  I have reviewed this plan of care and certify a need for   Medically necessary rehabilitation services.   [] Physician Signature    Date:     Electronically signed by: Alberto Aguero, PT DPT    [facility] [place] ([facility]) @ [facility]

## 2018-02-15 ENCOUNTER — HOSPITAL ENCOUNTER (OUTPATIENT)
Dept: PHYSICAL THERAPY | Age: 68
Setting detail: THERAPIES SERIES
Discharge: HOME | End: 2018-02-15
Payer: MEDICARE

## 2018-02-15 PROCEDURE — 97113 AQUATIC THERAPY/EXERCISES: CPT

## 2018-02-15 ASSESSMENT — PAIN DESCRIPTION - ORIENTATION: ORIENTATION: RIGHT;LEFT

## 2018-02-15 ASSESSMENT — PAIN DESCRIPTION - LOCATION: LOCATION: BACK;BUTTOCKS

## 2018-02-15 ASSESSMENT — PAIN SCALES - GENERAL: PAINLEVEL_OUTOF10: 9

## 2018-02-15 ASSESSMENT — PAIN DESCRIPTION - PAIN TYPE: TYPE: CHRONIC PAIN

## 2018-02-15 NOTE — PROGRESS NOTES
Physical Therapy  [facility]   Outpatient Physical Therapy  [address]  Phone: [phone]  Fax: [phone]  Daily Progress Note    Date: 2/15/18    Patient Name: Cinda Primrose        MRN: [id number]  Account: [de-identified] : 1950      General Information:  Patient assessed for rehabilitation services?: Yes  Family / Caregiver Present: Yes (Wife)  Referring Practitioner: Ebony Siemens MD   Referral Date : 18  Diagnosis: Sacroiliitis, not elsewhere classified, (M46.1)  Follows Commands: Within Functional Limits  Onset Date: 18  PT Insurance Information: Medicare   Total # of Visits Approved: 12  Total # of Visits to Date: 2  No Show: 0  Canceled Appointment: 0    Subjective:  Subjective: Patient reports the eval made him really sore and hurting more. notes only pain in lumbar back with no radiating symptoms     Pain:  Patient Currently in Pain: Yes  Pain Assessment: 0-10  Pain Level: 9  Pain Type: Chronic pain  Pain Location: Back;Buttocks  Pain Orientation: Right;Left       Objective:    [facility] Exercise Log  Aquatic, Hip & DLS Program- Phase 1     Date of Eval: 2018                                Primary PT: Raz Vergara PT DPT  Diagnosis: Sacroiliitis (M46.1)  Things to Focus On (goals): Pain control, ROM and strength   Surgical Precautions:  Medical Precautions: asthma, HTN, [place], nocturia, lumbar spinal stenosis, irregular heart beat  [] C-9 dates  [] Occ Med   [x] Medicare      Name: Cinda Primrose  Date  2/15/18          Visit #  2/12          Walk F/L/R  2 Laps          Marching  10x          Squats 10x3\"          Step-Ups F/L             Heel-toe raises  10x           SLR F/L/R  10x           Knee/Flex/Ext  10x           F/L Lunges             Kickboard Ex.  Sm           Iso Abd. 10x5\"           Push-pull 10x           Paddling                          Balance                                         Stretches

## 2018-02-20 ENCOUNTER — HOSPITAL ENCOUNTER (OUTPATIENT)
Dept: PHYSICAL THERAPY | Age: 68
Setting detail: THERAPIES SERIES
Discharge: HOME | End: 2018-02-20
Payer: MEDICARE

## 2018-02-20 PROCEDURE — 97113 AQUATIC THERAPY/EXERCISES: CPT

## 2018-02-20 ASSESSMENT — PAIN SCALES - GENERAL: PAINLEVEL_OUTOF10: 9

## 2018-02-20 ASSESSMENT — PAIN DESCRIPTION - FREQUENCY: FREQUENCY: CONTINUOUS

## 2018-02-20 ASSESSMENT — PAIN DESCRIPTION - PAIN TYPE: TYPE: CHRONIC PAIN

## 2018-02-20 ASSESSMENT — PAIN DESCRIPTION - ORIENTATION: ORIENTATION: RIGHT;LEFT;LOWER

## 2018-02-20 ASSESSMENT — PAIN DESCRIPTION - LOCATION: LOCATION: BACK;BUTTOCKS

## 2018-02-22 ENCOUNTER — HOSPITAL ENCOUNTER (OUTPATIENT)
Dept: PHYSICAL THERAPY | Age: 68
Setting detail: THERAPIES SERIES
Discharge: HOME | End: 2018-02-22
Payer: MEDICARE

## 2018-02-22 PROCEDURE — 97113 AQUATIC THERAPY/EXERCISES: CPT

## 2018-02-22 ASSESSMENT — PAIN DESCRIPTION - LOCATION: LOCATION: BACK;BUTTOCKS

## 2018-02-22 ASSESSMENT — PAIN DESCRIPTION - PAIN TYPE: TYPE: CHRONIC PAIN

## 2018-02-22 ASSESSMENT — PAIN DESCRIPTION - FREQUENCY: FREQUENCY: CONTINUOUS

## 2018-02-22 ASSESSMENT — PAIN SCALES - GENERAL: PAINLEVEL_OUTOF10: 9

## 2018-02-22 ASSESSMENT — PAIN DESCRIPTION - ORIENTATION: ORIENTATION: RIGHT;LEFT;LOWER

## 2018-02-27 ENCOUNTER — HOSPITAL ENCOUNTER (OUTPATIENT)
Dept: PHYSICAL THERAPY | Age: 68
Setting detail: THERAPIES SERIES
Discharge: HOME | End: 2018-02-27
Payer: MEDICARE

## 2018-02-27 PROCEDURE — 97113 AQUATIC THERAPY/EXERCISES: CPT

## 2018-02-27 ASSESSMENT — PAIN DESCRIPTION - PAIN TYPE: TYPE: CHRONIC PAIN

## 2018-02-27 ASSESSMENT — PAIN DESCRIPTION - ORIENTATION: ORIENTATION: LOWER;LEFT;RIGHT

## 2018-02-27 ASSESSMENT — PAIN DESCRIPTION - FREQUENCY: FREQUENCY: CONTINUOUS

## 2018-02-27 ASSESSMENT — PAIN SCALES - GENERAL: PAINLEVEL_OUTOF10: 9

## 2018-02-27 ASSESSMENT — PAIN DESCRIPTION - LOCATION: LOCATION: BACK;BUTTOCKS

## 2018-02-27 ASSESSMENT — PAIN DESCRIPTION - DESCRIPTORS: DESCRIPTORS: CONSTANT;ACHING

## 2018-03-01 ENCOUNTER — HOSPITAL ENCOUNTER (OUTPATIENT)
Dept: PHYSICAL THERAPY | Age: 68
Setting detail: THERAPIES SERIES
Discharge: HOME | End: 2018-03-01
Payer: MEDICARE

## 2018-03-01 PROCEDURE — 97113 AQUATIC THERAPY/EXERCISES: CPT

## 2018-03-01 PROCEDURE — G8978 MOBILITY CURRENT STATUS: HCPCS

## 2018-03-01 PROCEDURE — G8979 MOBILITY GOAL STATUS: HCPCS

## 2018-03-01 ASSESSMENT — PAIN DESCRIPTION - FREQUENCY: FREQUENCY: CONTINUOUS

## 2018-03-01 ASSESSMENT — PAIN DESCRIPTION - PAIN TYPE: TYPE: CHRONIC PAIN

## 2018-03-01 ASSESSMENT — PAIN DESCRIPTION - ORIENTATION: ORIENTATION: LOWER;LEFT;RIGHT

## 2018-03-01 ASSESSMENT — PAIN DESCRIPTION - LOCATION: LOCATION: BACK;BUTTOCKS

## 2018-03-01 ASSESSMENT — PAIN DESCRIPTION - DESCRIPTORS: DESCRIPTORS: CONSTANT;ACHING

## 2018-03-01 ASSESSMENT — PAIN SCALES - GENERAL: PAINLEVEL_OUTOF10: 9

## 2018-03-01 NOTE — PROGRESS NOTES
Physical Therapy  [address]   Outpatient Physical Therapy  [address]  Phone: [phone]  Fax: [phone]  Daily Progress Note    Date: 3/1/18    Patient Name: Hernesto Judge        MRN: [id number]  Account: [de-identified] : 1950      General Information:  Chart Reviewed: Yes  Patient assessed for rehabilitation services?: Yes  Referring Practitioner: Amara Lugo MD   Referral Date : 18  Diagnosis: Sacroiliitis, not elsewhere classified, (M46.1)  Follows Commands: Within Functional Limits  Onset Date: 18  PT Insurance Information: Medicare   Total # of Visits Approved: 12  Total # of Visits to Date: 6  No Show: 0  Canceled Appointment: 0    Subjective:  Subjective: no new complaints today. no increased pain or soreness after last appointment     Pain:  Patient Currently in Pain: Yes  Pain Assessment: 0-10  Pain Level: 9  Pain Type: Chronic pain  Pain Location: Back;Buttocks  Pain Orientation: Lower;Left;Right  Pain Descriptors: Constant; Aching  Pain Frequency: Continuous       Objective:[facility]   Rehabilitation Services Exercise Log  Aquatic, Hip & DLS Program- Phase 1     Date of Eval: 2018                                Primary PT: Nathan Dixon PT DPT  Diagnosis: Sacroiliitis (M46.1)  Things to Focus On (goals): Pain control, ROM and strength   Surgical Precautions:  Medical Precautions: asthma, HTN, [place], nocturia, lumbar spinal stenosis, irregular heart beat  [] C-9 dates  [] Occ Med   [x] Medicare      Name: Hernesto Judge  Date  2/20/18 2/22/18   2/27/18  3/1/18   Visit #  3/12 4/12  5/12  6/12   Walk F/L/R 2 Laps 2 Laps  2 Laps 2 Laps   Marching  10x 12x  12x  12x   Squats  10x3\"  10x3\"  12x3\" 12x3\"   Step-Ups F/L     Low 10x Low 10x   Heel-toe raises  10x  10x  12x 12x   SLR F/L/R  10x  12x  12x 12x   Knee/Flex/Ext  10x  10x  12x 12x   F/L Lunges           Kickboard Ex.  Sm Sm  Sm Sm    Iso Abd. 10x5\"  10x5\" 12x5\" 12x5\"   Push-pull  10x 10x

## 2018-03-06 ENCOUNTER — APPOINTMENT (OUTPATIENT)
Dept: PHYSICAL THERAPY | Age: 68
End: 2018-03-06
Payer: MEDICARE

## 2018-03-08 ENCOUNTER — OFFICE VISIT (OUTPATIENT)
Dept: UROLOGY | Age: 68
End: 2018-03-08
Payer: MEDICARE

## 2018-03-08 ENCOUNTER — HOSPITAL ENCOUNTER (OUTPATIENT)
Age: 68
Setting detail: SPECIMEN
Discharge: HOME | End: 2018-03-08
Payer: MEDICARE

## 2018-03-08 VITALS
DIASTOLIC BLOOD PRESSURE: 78 MMHG | TEMPERATURE: 98 F | WEIGHT: 163.8 LBS | BODY MASS INDEX: 22.93 KG/M2 | HEIGHT: 71 IN | HEART RATE: 64 BPM | SYSTOLIC BLOOD PRESSURE: 143 MMHG

## 2018-03-08 DIAGNOSIS — R30.0 DYSURIA: ICD-10-CM

## 2018-03-08 DIAGNOSIS — R39.12 BENIGN PROSTATIC HYPERPLASIA WITH WEAK URINARY STREAM: ICD-10-CM

## 2018-03-08 DIAGNOSIS — R33.9 INCOMPLETE EMPTYING OF BLADDER: Primary | ICD-10-CM

## 2018-03-08 DIAGNOSIS — N40.1 BENIGN PROSTATIC HYPERPLASIA WITH WEAK URINARY STREAM: ICD-10-CM

## 2018-03-08 PROCEDURE — G8420 CALC BMI NORM PARAMETERS: HCPCS | Performed by: UROLOGY

## 2018-03-08 PROCEDURE — G8427 DOCREV CUR MEDS BY ELIG CLIN: HCPCS | Performed by: UROLOGY

## 2018-03-08 PROCEDURE — 4040F PNEUMOC VAC/ADMIN/RCVD: CPT | Performed by: UROLOGY

## 2018-03-08 PROCEDURE — G8484 FLU IMMUNIZE NO ADMIN: HCPCS | Performed by: UROLOGY

## 2018-03-08 PROCEDURE — 1123F ACP DISCUSS/DSCN MKR DOCD: CPT | Performed by: UROLOGY

## 2018-03-08 PROCEDURE — 51798 US URINE CAPACITY MEASURE: CPT | Performed by: UROLOGY

## 2018-03-08 PROCEDURE — 99214 OFFICE O/P EST MOD 30 MIN: CPT | Performed by: UROLOGY

## 2018-03-08 PROCEDURE — 3017F COLORECTAL CA SCREEN DOC REV: CPT | Performed by: UROLOGY

## 2018-03-08 PROCEDURE — 1036F TOBACCO NON-USER: CPT | Performed by: UROLOGY

## 2018-03-08 PROCEDURE — 81003 URINALYSIS AUTO W/O SCOPE: CPT | Performed by: UROLOGY

## 2018-03-08 RX ORDER — DOXYCYCLINE 100 MG/1
100 CAPSULE ORAL 2 TIMES DAILY
Qty: 60 CAPSULE | Refills: 0 | Status: SHIPPED | OUTPATIENT
Start: 2018-03-08

## 2018-03-08 RX ORDER — DULOXETIN HYDROCHLORIDE 30 MG/1
CAPSULE, DELAYED RELEASE ORAL
COMMUNITY
Start: 2018-01-26

## 2018-03-08 ASSESSMENT — ENCOUNTER SYMPTOMS
ABDOMINAL PAIN: 0
EYE REDNESS: 0
BACK PAIN: 1
VOMITING: 0
SHORTNESS OF BREATH: 0
NAUSEA: 0
COUGH: 0
COLOR CHANGE: 0
EYE PAIN: 0
WHEEZING: 0

## 2018-03-08 NOTE — PROGRESS NOTES
MULTIVITAMIN-MINERALS) tablet Take 1 tablet by mouth daily      docusate sodium (COLACE) 100 MG capsule Take 1 capsule by mouth 2 times daily (Patient taking differently: Take 100 mg by mouth daily ) 14 capsule 0    losartan (COZAAR) 100 MG tablet Take 100 mg by mouth daily      metoprolol tartrate (LOPRESSOR) 50 MG tablet Take 25 mg by mouth 2 times daily       mometasone-formoterol (DULERA) 200-5 MCG/ACT inhaler Inhale 2 puffs into the lungs 2 times daily       albuterol sulfate HFA (PROAIR HFA) 108 (90 Base) MCG/ACT inhaler Inhale 1 puff into the lungs as needed         Prednisone; Ciprofloxacin; and Dust mite extract  History   Smoking Status    Never Smoker   Smokeless Tobacco    Never Used     (If patient a smoker, smoking cessation counseling offered)    History   Alcohol Use    1.2 oz/week    2 Cans of beer per week     Comment: 2-3 TIMES PER WEEK       REVIEW OF SYSTEMS:  Review of Systems   Constitutional: Negative for appetite change, chills and fever. Eyes: Negative for pain, redness and visual disturbance. Respiratory: Negative for cough, shortness of breath and wheezing. Cardiovascular: Negative for chest pain and leg swelling. Gastrointestinal: Negative for abdominal pain, nausea and vomiting. Genitourinary: Positive for penile pain. Negative for difficulty urinating, discharge, dysuria, flank pain, frequency, hematuria, scrotal swelling, testicular pain and urgency. Musculoskeletal: Positive for back pain. Negative for joint swelling and myalgias. Skin: Negative for color change, rash and wound. Neurological: Negative for dizziness, tremors and numbness. Hematological: Negative for adenopathy. Does not bruise/bleed easily. Physical Exam:      Vitals:    03/08/18 0920   BP: (!) 143/78   Pulse: 64   Temp: 98 °F (36.7 °C)     Body mass index is 22.85 kg/m². Patient is a 80 y.o. male in no acute distress and alert and oriented to person, place and time.   Physical

## 2018-03-09 ENCOUNTER — TELEPHONE (OUTPATIENT)
Dept: UROLOGY | Age: 68
End: 2018-03-09

## 2018-03-09 ENCOUNTER — APPOINTMENT (OUTPATIENT)
Dept: PHYSICAL THERAPY | Age: 68
End: 2018-03-09
Payer: MEDICARE

## 2018-03-09 LAB
CULTURE: NORMAL
CULTURE: NORMAL
Lab: NORMAL
SPECIMEN DESCRIPTION: NORMAL
STATUS: NORMAL

## 2018-03-21 ENCOUNTER — HOSPITAL ENCOUNTER (OUTPATIENT)
Dept: PAIN MANAGEMENT | Age: 68
Discharge: HOME | End: 2018-03-21
Payer: MEDICARE

## 2018-03-21 VITALS
DIASTOLIC BLOOD PRESSURE: 71 MMHG | BODY MASS INDEX: 22.82 KG/M2 | HEART RATE: 55 BPM | RESPIRATION RATE: 16 BRPM | SYSTOLIC BLOOD PRESSURE: 128 MMHG | HEIGHT: 71 IN | TEMPERATURE: 97.8 F | WEIGHT: 163 LBS

## 2018-03-21 PROCEDURE — 99214 OFFICE O/P EST MOD 30 MIN: CPT

## 2018-03-21 PROCEDURE — 99213 OFFICE O/P EST LOW 20 MIN: CPT | Performed by: ANESTHESIOLOGY

## 2018-03-21 ASSESSMENT — PAIN DESCRIPTION - PROGRESSION: CLINICAL_PROGRESSION: GRADUALLY WORSENING

## 2018-03-21 ASSESSMENT — PAIN SCALES - GENERAL: PAINLEVEL_OUTOF10: 5

## 2018-03-21 ASSESSMENT — PAIN DESCRIPTION - PAIN TYPE: TYPE: CHRONIC PAIN

## 2018-03-21 ASSESSMENT — PAIN DESCRIPTION - DESCRIPTORS: DESCRIPTORS: BURNING;CONSTANT

## 2018-03-21 ASSESSMENT — PAIN DESCRIPTION - LOCATION: LOCATION: BACK

## 2018-03-21 ASSESSMENT — PAIN DESCRIPTION - ONSET: ONSET: ON-GOING

## 2018-03-21 ASSESSMENT — PAIN DESCRIPTION - ORIENTATION: ORIENTATION: LOWER

## 2018-03-21 ASSESSMENT — PAIN DESCRIPTION - FREQUENCY: FREQUENCY: CONTINUOUS

## 2018-03-21 NOTE — PROGRESS NOTES
/71   Pulse 55   Temp 97.8 °F (36.6 °C) (Oral)   Resp 16   Ht 5' 11\" (1.803 m)   Wt 163 lb (73.9 kg)   BMI 22.73 kg/m²      Controlled Substances Monitoring:                                General Appearance: alert and oriented to person, place and time and in no acute distress  Musculoskeletal: no swollen joints, no joint deformity and Has Rt. S ibuprofen. tenderness  Neurologic: gait and coordination normal and speech normal  Mood and affect: alert and oriented to person, place, time and situation    Impression  Patient Active Problem List   Diagnosis    Postlaminectomy syndrome, lumbar region    Bilateral sacroiliitis (HCC)    Chronic back pain    [facility]       Plan of Care  Will do one more diagnostic Rt. S.I. Joint injection    ISABELL Byrd        I have made any additions and/or corrections, if necessary, to the above scribed information. I have reviewed and agree with the above information. Patient is here today for an office visit. The patient is a [facility] patient : No     The [facility] allowed levels are: n/a    Reason for the office visit: follow up si/options    Type of last diagnostic: lumbar xray    Date of last diagnostic:11/24/2017    Pain Assessment  Pain Level: 5  Pain Type: Chronic pain  Pain Location: Back  Pain Orientation: Lower  Pain Radiating Towards: tyrese low back to tyrese upper buttocks, right worse, sometimes to right knee  Pain Descriptors: Burning, Constant  Pain Frequency: Continuous  Pain Onset: On-going  Clinical Progression: Gradually worsening  Effect of Pain on Daily Activities: 4/5 lifting, prolonged sitting, working in his workshop  Pain Intervention(s):  Other (Comment), Repositioned, Heat applied, Rest (walking it out)  POSS Score (Patient Ctrl Analgesia): 1    MOI  Today  No    OARRS today  No     Result of OARRS - n/a    Morphine equivalent dose as reported on OARRS:  n/a    Pill count today   No  Comments none    Last dose taken: n/a    Sleep Pattern, 4 hours per night\"just have a lot on my mind\" plus bathroom visits   nightime awakenings    ER visits related to Pain? No  Date of ER visit   N/A    Other Hospitalization  No    Reason for Hospitalization n/a    Date of last Pain Intervention  January / 2018    Type of Pain Intervention tyrese SI injection    Was there pain relief from Pain Intervention: Yes    How long was your pain relief from the Pain Intervention 4 hours    What percentage of your pain was relieved  100%    Working  retired    Home Exercises daily walking    When was your last Physical Therapy: mid February 2018    How often did you go to Physical Therapy :  5 sessions    Physical Therapy Results beneficial\" but the chlorine got to me\"         Medication Side Effects:  Constipation  No      Sedation  Yes    Urinary Retention  No  Other Medication Side Effect elavil \"I didn't want to do anything. . didn't even care if I got up\"    Are you on a Bowel Regime  :  Diet  No   Medication Yes colace as needed    Are you having any of these Emotional Issues? normal    Are you seeing a Psychiatrist or Psychologist  No     Have you ever had thoughts of hurting yourself  No    Outpatient Prescriptions Marked as Taking for the 3/21/18 encounter [facility] Encounter) with STV PAIN FLUORO RM 1   Medication Sig Dispense Refill    DULoxetine (CYMBALTA) 30 MG extended release capsule       doxycycline monohydrate (MONODOX) 100 MG capsule Take 1 capsule by mouth 2 times daily 60 capsule 0    Multiple Vitamins-Minerals (THERAPEUTIC MULTIVITAMIN-MINERALS) tablet Take 1 tablet by mouth daily      docusate sodium (COLACE) 100 MG capsule Take 1 capsule by mouth 2 times daily (Patient taking differently: Take 100 mg by mouth daily ) 14 capsule 0    losartan (COZAAR) 100 MG tablet Take 100 mg by mouth daily      metoprolol tartrate (LOPRESSOR) 50 MG tablet Take 25 mg by mouth 2 times daily       mometasone-formoterol (DULERA) 200-5 MCG/ACT inhaler

## 2022-08-18 NOTE — TELEPHONE ENCOUNTER
Pt had greenlight procedure 12/1/17. Spoke with physician in office (Dr. Emir Alfred), OK to order urine culture.
no enlarged lymph nodes

## (undated) DEVICE — BAG URIN DRNGE 2000ML VYN INF CTRL GRAD ANTI REFLX VLV

## (undated) DEVICE — DALE FOLEY CATHETER HOLDER, LEGBAND, FITS UP TO 30": Brand: DALE FOLEY CATHETER HOLDER

## (undated) DEVICE — CATHETER URETH 20FR BLLN 30CC 3 W F SPEC INF CTRL BARDX

## (undated) DEVICE — GLOVE ORANGE PI 7 1/2   MSG9075

## (undated) DEVICE — PACK PROCEDURE SURG CYSTO SVMMC LF

## (undated) DEVICE — CYSTO/BLADDER IRRIGATION SET, REGULATING CLAMP

## (undated) DEVICE — GUIDEWIRE URO L150CM DIA0.035IN STIFF NIT HYDRPHLC STR TIP

## (undated) DEVICE — LASER SURG W22XH58IN D36IN 475LB SLD STATE FREQ DOUBLED

## (undated) DEVICE — CATHETER PLUG WITH CAP: Brand: DOVER

## (undated) DEVICE — PROTECTOR ULN NRV PUR FOAM HK LOOP STRP ANATOMICALLY